# Patient Record
Sex: MALE | Race: WHITE | URBAN - METROPOLITAN AREA
[De-identification: names, ages, dates, MRNs, and addresses within clinical notes are randomized per-mention and may not be internally consistent; named-entity substitution may affect disease eponyms.]

---

## 2017-06-08 ENCOUNTER — EMERGENCY (EMERGENCY)
Facility: HOSPITAL | Age: 62
LOS: 0 days | Discharge: HOME | End: 2017-06-08

## 2017-06-28 DIAGNOSIS — R42 DIZZINESS AND GIDDINESS: ICD-10-CM

## 2017-06-28 DIAGNOSIS — Z79.899 OTHER LONG TERM (CURRENT) DRUG THERAPY: ICD-10-CM

## 2017-06-28 DIAGNOSIS — I10 ESSENTIAL (PRIMARY) HYPERTENSION: ICD-10-CM

## 2017-07-03 ENCOUNTER — EMERGENCY (EMERGENCY)
Facility: HOSPITAL | Age: 62
LOS: 0 days | Discharge: HOME | End: 2017-07-04

## 2017-07-03 ENCOUNTER — EMERGENCY (EMERGENCY)
Facility: HOSPITAL | Age: 62
LOS: 0 days | Discharge: HOME | End: 2017-07-03

## 2017-07-03 DIAGNOSIS — R10.31 RIGHT LOWER QUADRANT PAIN: ICD-10-CM

## 2017-07-03 DIAGNOSIS — Z79.899 OTHER LONG TERM (CURRENT) DRUG THERAPY: ICD-10-CM

## 2017-07-03 DIAGNOSIS — I10 ESSENTIAL (PRIMARY) HYPERTENSION: ICD-10-CM

## 2017-07-03 DIAGNOSIS — N48.89 OTHER SPECIFIED DISORDERS OF PENIS: ICD-10-CM

## 2017-07-03 DIAGNOSIS — R20.0 ANESTHESIA OF SKIN: ICD-10-CM

## 2017-07-06 ENCOUNTER — EMERGENCY (EMERGENCY)
Facility: HOSPITAL | Age: 62
LOS: 0 days | Discharge: HOME | End: 2017-07-06

## 2017-07-06 DIAGNOSIS — I10 ESSENTIAL (PRIMARY) HYPERTENSION: ICD-10-CM

## 2017-07-06 DIAGNOSIS — R42 DIZZINESS AND GIDDINESS: ICD-10-CM

## 2017-07-06 DIAGNOSIS — Z79.899 OTHER LONG TERM (CURRENT) DRUG THERAPY: ICD-10-CM

## 2017-07-06 DIAGNOSIS — R20.0 ANESTHESIA OF SKIN: ICD-10-CM

## 2017-07-18 ENCOUNTER — EMERGENCY (EMERGENCY)
Facility: HOSPITAL | Age: 62
LOS: 0 days | Discharge: HOME | End: 2017-07-18

## 2017-07-18 DIAGNOSIS — F41.9 ANXIETY DISORDER, UNSPECIFIED: ICD-10-CM

## 2017-07-18 DIAGNOSIS — F41.0 PANIC DISORDER [EPISODIC PAROXYSMAL ANXIETY]: ICD-10-CM

## 2017-07-18 DIAGNOSIS — F45.0 SOMATIZATION DISORDER: ICD-10-CM

## 2017-07-18 DIAGNOSIS — T43.595A ADVERSE EFFECT OF OTHER ANTIPSYCHOTICS AND NEUROLEPTICS, INITIAL ENCOUNTER: ICD-10-CM

## 2017-07-18 DIAGNOSIS — G47.00 INSOMNIA, UNSPECIFIED: ICD-10-CM

## 2017-07-18 DIAGNOSIS — R00.2 PALPITATIONS: ICD-10-CM

## 2017-07-18 DIAGNOSIS — I10 ESSENTIAL (PRIMARY) HYPERTENSION: ICD-10-CM

## 2017-07-18 DIAGNOSIS — Z79.899 OTHER LONG TERM (CURRENT) DRUG THERAPY: ICD-10-CM

## 2017-07-18 DIAGNOSIS — R42 DIZZINESS AND GIDDINESS: ICD-10-CM

## 2018-04-16 ENCOUNTER — EMERGENCY (EMERGENCY)
Facility: HOSPITAL | Age: 63
LOS: 0 days | Discharge: HOME | End: 2018-04-16
Attending: EMERGENCY MEDICINE | Admitting: INTERNAL MEDICINE

## 2018-04-16 VITALS
TEMPERATURE: 99 F | DIASTOLIC BLOOD PRESSURE: 111 MMHG | SYSTOLIC BLOOD PRESSURE: 181 MMHG | RESPIRATION RATE: 18 BRPM | OXYGEN SATURATION: 95 % | HEART RATE: 82 BPM

## 2018-04-16 VITALS
TEMPERATURE: 98 F | HEART RATE: 64 BPM | RESPIRATION RATE: 18 BRPM | DIASTOLIC BLOOD PRESSURE: 82 MMHG | OXYGEN SATURATION: 97 % | SYSTOLIC BLOOD PRESSURE: 140 MMHG

## 2018-04-16 DIAGNOSIS — Z79.899 OTHER LONG TERM (CURRENT) DRUG THERAPY: ICD-10-CM

## 2018-04-16 DIAGNOSIS — I10 ESSENTIAL (PRIMARY) HYPERTENSION: ICD-10-CM

## 2018-04-16 DIAGNOSIS — Z90.49 ACQUIRED ABSENCE OF OTHER SPECIFIED PARTS OF DIGESTIVE TRACT: ICD-10-CM

## 2018-04-16 DIAGNOSIS — Z98.890 OTHER SPECIFIED POSTPROCEDURAL STATES: Chronic | ICD-10-CM

## 2018-04-16 DIAGNOSIS — R42 DIZZINESS AND GIDDINESS: ICD-10-CM

## 2018-04-16 DIAGNOSIS — R60.0 LOCALIZED EDEMA: ICD-10-CM

## 2018-04-16 LAB
ALBUMIN SERPL ELPH-MCNC: 4.1 G/DL — SIGNIFICANT CHANGE UP (ref 3.5–5.2)
ALP SERPL-CCNC: 49 U/L — SIGNIFICANT CHANGE UP (ref 30–115)
ALT FLD-CCNC: 28 U/L — SIGNIFICANT CHANGE UP (ref 0–41)
ANION GAP SERPL CALC-SCNC: 13 MMOL/L — SIGNIFICANT CHANGE UP (ref 7–14)
AST SERPL-CCNC: 26 U/L — SIGNIFICANT CHANGE UP (ref 0–41)
BASOPHILS # BLD AUTO: 0.1 K/UL — SIGNIFICANT CHANGE UP (ref 0–0.2)
BASOPHILS NFR BLD AUTO: 1 % — SIGNIFICANT CHANGE UP (ref 0–1)
BILIRUB SERPL-MCNC: 0.4 MG/DL — SIGNIFICANT CHANGE UP (ref 0.2–1.2)
BUN SERPL-MCNC: 13 MG/DL — SIGNIFICANT CHANGE UP (ref 10–20)
CALCIUM SERPL-MCNC: 9 MG/DL — SIGNIFICANT CHANGE UP (ref 8.5–10.1)
CHLORIDE SERPL-SCNC: 103 MMOL/L — SIGNIFICANT CHANGE UP (ref 98–110)
CO2 SERPL-SCNC: 26 MMOL/L — SIGNIFICANT CHANGE UP (ref 17–32)
CREAT SERPL-MCNC: 1 MG/DL — SIGNIFICANT CHANGE UP (ref 0.7–1.5)
EOSINOPHIL # BLD AUTO: 0.03 K/UL — SIGNIFICANT CHANGE UP (ref 0–0.7)
EOSINOPHIL NFR BLD AUTO: 0.3 % — SIGNIFICANT CHANGE UP (ref 0–8)
GLUCOSE SERPL-MCNC: 117 MG/DL — HIGH (ref 70–99)
HCT VFR BLD CALC: 45.5 % — SIGNIFICANT CHANGE UP (ref 42–52)
HGB BLD-MCNC: 16.2 G/DL — SIGNIFICANT CHANGE UP (ref 14–18)
IMM GRANULOCYTES NFR BLD AUTO: 0.9 % — HIGH (ref 0.1–0.3)
LYMPHOCYTES # BLD AUTO: 1.46 K/UL — SIGNIFICANT CHANGE UP (ref 1.2–3.4)
LYMPHOCYTES # BLD AUTO: 14.9 % — LOW (ref 20.5–51.1)
MCHC RBC-ENTMCNC: 30.5 PG — SIGNIFICANT CHANGE UP (ref 27–31)
MCHC RBC-ENTMCNC: 35.6 G/DL — SIGNIFICANT CHANGE UP (ref 32–37)
MCV RBC AUTO: 85.7 FL — SIGNIFICANT CHANGE UP (ref 80–94)
MONOCYTES # BLD AUTO: 0.64 K/UL — HIGH (ref 0.1–0.6)
MONOCYTES NFR BLD AUTO: 6.5 % — SIGNIFICANT CHANGE UP (ref 1.7–9.3)
NEUTROPHILS # BLD AUTO: 7.46 K/UL — HIGH (ref 1.4–6.5)
NEUTROPHILS NFR BLD AUTO: 76.4 % — HIGH (ref 42.2–75.2)
NRBC # BLD: 0 /100 WBCS — SIGNIFICANT CHANGE UP (ref 0–0)
PLATELET # BLD AUTO: 189 K/UL — SIGNIFICANT CHANGE UP (ref 130–400)
POTASSIUM SERPL-MCNC: 4.1 MMOL/L — SIGNIFICANT CHANGE UP (ref 3.5–5)
POTASSIUM SERPL-SCNC: 4.1 MMOL/L — SIGNIFICANT CHANGE UP (ref 3.5–5)
PROT SERPL-MCNC: 6.7 G/DL — SIGNIFICANT CHANGE UP (ref 6–8)
RBC # BLD: 5.31 M/UL — SIGNIFICANT CHANGE UP (ref 4.7–6.1)
RBC # FLD: 12 % — SIGNIFICANT CHANGE UP (ref 11.5–14.5)
SODIUM SERPL-SCNC: 142 MMOL/L — SIGNIFICANT CHANGE UP (ref 135–146)
TROPONIN T SERPL-MCNC: <0.01 NG/ML — SIGNIFICANT CHANGE UP
WBC # BLD: 9.78 K/UL — SIGNIFICANT CHANGE UP (ref 4.8–10.8)
WBC # FLD AUTO: 9.78 K/UL — SIGNIFICANT CHANGE UP (ref 4.8–10.8)

## 2018-04-16 RX ORDER — AMLODIPINE BESYLATE 2.5 MG/1
1 TABLET ORAL
Qty: 10 | Refills: 0 | OUTPATIENT
Start: 2018-04-16 | End: 2018-04-25

## 2018-04-16 NOTE — ED PROVIDER NOTE - OBJECTIVE STATEMENT
62 y m no pmh pw hypertension. Felt "faint" this morning. Describes as lightheadedness intermittently since he woke up today. Checked blood pressure at work and noted to be 200 systolic. EMS called to work. Lightheadedness resolved at this time, currently feeling mild frontal headache. NO cp, sob, abd pain, n/v, fever, chills, dysuria, hematuria, change in urine output, blurry vision, weakness of extremities. No known hx of hypertension, no regular medications.

## 2018-04-16 NOTE — ED PROVIDER NOTE - NS ED ROS FT
Eyes:  No visual changes, eye pain or discharge.  ENMT:  No hearing changes, pain, discharge or infections. No neck pain or stiffness.  Cardiac:  No chest pain, SOB or edema.   Respiratory:  No cough or respiratory distress.   GI:  No nausea, vomiting, diarrhea or abdominal pain.  :  No dysuria, frequency or burning.  MS:  No myalgia, muscle weakness, joint pain or back pain.  Neuro:  Headache. No weakness.  No LOC.  Skin:  No skin rash.

## 2018-04-16 NOTE — ED ADULT NURSE NOTE - OBJECTIVE STATEMENT
Pt presents to ED with HTN, taken by school nurse 240/120. In triage 180/111. Noother complaints or symptoms.

## 2018-04-16 NOTE — ED PROVIDER NOTE - PHYSICAL EXAMINATION
CONSTITUTIONAL: Well-developed; well-nourished; in no acute distress.   SKIN: warm, dry  HEAD: Normocephalic; atraumatic.  EYES: PERRL, EOMI, normal sclera and conjunctiva   ENT: No nasal discharge; airway clear.  NECK: Supple; non tender.  CARD: S1, S2 normal; no murmurs, gallops, or rubs. Regular rate and rhythm.   RESP: No wheezes, rales or rhonchi.  ABD: soft ntnd, bowel sounds normal.  EXT: Normal ROM.  No clubbing, cyanosis or edema. No posterior calf ttp.  LYMPH: No acute cervical adenopathy.  NEURO: Alert, oriented, grossly unremarkable. No cranial nerve deficits, strength and sensation equal over all 4 extremities, moving all extremities.  PSYCH: Cooperative, appropriate.

## 2018-04-16 NOTE — ED ADULT NURSE NOTE - CHIEF COMPLAINT QUOTE
Pt reports feeling tingling all over so had BP taken at work and was found to be high. Denies history of hypertension.

## 2018-04-16 NOTE — ED PROVIDER NOTE - PROGRESS NOTE DETAILS
ATTENDING NOTE:   61 y/o M with no significant PMH presents to ED for evaluation of hypertension. Pt reports that over the last few weeks he had URI SX, but resolved few days ago. Today at work felt a “sensation” in the left side of his chest that lasted few seconds and resolved. (+)Associated dizziness during this episode, lasted a few minutes and resolved. 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 No SOB, abdominal pain, nausea, vomiting. Notes some swelling to b/l LE, no pain. Before today he last took his BP few weeks ago ’s systolic.    On exam: Pt is non-toxic, WA. PERRL, EOMI, pink conjunctiva, anicteric. MMM, tongue is midline. Neck is supple, no crepitus over neck or chest wall. Radial pulses are equal b/l. Regular rate, no murmurs. Lungs CTAB. Abdomen is soft, NT/ND. No CVAT. (+)Trace pitting edema b/l, no calf tenderness. No focal neuro deficits. Normal gait.   Will get labs, imaging and reassess. Pt asymptomatic at this time. Discussed lab, xray, ekg results. Will send pt amlodipine prescription to pharmacy until he can reach pmd office. Told him to follow up with Dr. Young, his pmd, later this week for long term bp management.

## 2018-05-03 ENCOUNTER — OUTPATIENT (OUTPATIENT)
Dept: OUTPATIENT SERVICES | Facility: HOSPITAL | Age: 63
LOS: 1 days | Discharge: HOME | End: 2018-05-03

## 2018-05-03 DIAGNOSIS — R05 COUGH: ICD-10-CM

## 2018-05-03 DIAGNOSIS — Z98.890 OTHER SPECIFIED POSTPROCEDURAL STATES: Chronic | ICD-10-CM

## 2018-05-10 ENCOUNTER — EMERGENCY (EMERGENCY)
Facility: HOSPITAL | Age: 63
LOS: 0 days | Discharge: HOME | End: 2018-05-11
Attending: EMERGENCY MEDICINE | Admitting: EMERGENCY MEDICINE

## 2018-05-10 VITALS
TEMPERATURE: 97 F | SYSTOLIC BLOOD PRESSURE: 182 MMHG | HEART RATE: 85 BPM | DIASTOLIC BLOOD PRESSURE: 117 MMHG | OXYGEN SATURATION: 97 %

## 2018-05-10 DIAGNOSIS — R00.2 PALPITATIONS: ICD-10-CM

## 2018-05-10 DIAGNOSIS — R07.9 CHEST PAIN, UNSPECIFIED: ICD-10-CM

## 2018-05-10 DIAGNOSIS — Z98.890 OTHER SPECIFIED POSTPROCEDURAL STATES: Chronic | ICD-10-CM

## 2018-05-10 DIAGNOSIS — I10 ESSENTIAL (PRIMARY) HYPERTENSION: ICD-10-CM

## 2018-05-10 DIAGNOSIS — Z87.891 PERSONAL HISTORY OF NICOTINE DEPENDENCE: ICD-10-CM

## 2018-05-10 LAB
BASOPHILS # BLD AUTO: 0.09 K/UL — SIGNIFICANT CHANGE UP (ref 0–0.2)
BASOPHILS NFR BLD AUTO: 0.9 % — SIGNIFICANT CHANGE UP (ref 0–1)
EOSINOPHIL # BLD AUTO: 0.09 K/UL — SIGNIFICANT CHANGE UP (ref 0–0.7)
EOSINOPHIL NFR BLD AUTO: 0.9 % — SIGNIFICANT CHANGE UP (ref 0–8)
HCT VFR BLD CALC: 44.3 % — SIGNIFICANT CHANGE UP (ref 42–52)
HGB BLD-MCNC: 15.6 G/DL — SIGNIFICANT CHANGE UP (ref 14–18)
IMM GRANULOCYTES NFR BLD AUTO: 0.6 % — HIGH (ref 0.1–0.3)
LYMPHOCYTES # BLD AUTO: 2.61 K/UL — SIGNIFICANT CHANGE UP (ref 1.2–3.4)
LYMPHOCYTES # BLD AUTO: 26.1 % — SIGNIFICANT CHANGE UP (ref 20.5–51.1)
MCHC RBC-ENTMCNC: 30.6 PG — SIGNIFICANT CHANGE UP (ref 27–31)
MCHC RBC-ENTMCNC: 35.2 G/DL — SIGNIFICANT CHANGE UP (ref 32–37)
MCV RBC AUTO: 87 FL — SIGNIFICANT CHANGE UP (ref 80–94)
MONOCYTES # BLD AUTO: 0.66 K/UL — HIGH (ref 0.1–0.6)
MONOCYTES NFR BLD AUTO: 6.6 % — SIGNIFICANT CHANGE UP (ref 1.7–9.3)
NEUTROPHILS # BLD AUTO: 6.5 K/UL — SIGNIFICANT CHANGE UP (ref 1.4–6.5)
NEUTROPHILS NFR BLD AUTO: 64.9 % — SIGNIFICANT CHANGE UP (ref 42.2–75.2)
NRBC # BLD: 0 /100 WBCS — SIGNIFICANT CHANGE UP (ref 0–0)
PLATELET # BLD AUTO: 166 K/UL — SIGNIFICANT CHANGE UP (ref 130–400)
RBC # BLD: 5.09 M/UL — SIGNIFICANT CHANGE UP (ref 4.7–6.1)
RBC # FLD: 12.6 % — SIGNIFICANT CHANGE UP (ref 11.5–14.5)
WBC # BLD: 10.01 K/UL — SIGNIFICANT CHANGE UP (ref 4.8–10.8)
WBC # FLD AUTO: 10.01 K/UL — SIGNIFICANT CHANGE UP (ref 4.8–10.8)

## 2018-05-10 NOTE — ED ADULT NURSE NOTE - OBJECTIVE STATEMENT
pt states he accidentally took a metoprolol pill last week. since then he has been having intermittent sharp midsternal chest pain. reports dizziness, headache, nausea, palpitations, and generally "not feeling well".

## 2018-05-10 NOTE — ED ADULT TRIAGE NOTE - CHIEF COMPLAINT QUOTE
Pt came c/o left sided chest pain on and off for 1 week after taking his wife Metoprolol 25 mg by an accident.

## 2018-05-11 VITALS
RESPIRATION RATE: 18 BRPM | HEART RATE: 61 BPM | DIASTOLIC BLOOD PRESSURE: 89 MMHG | SYSTOLIC BLOOD PRESSURE: 153 MMHG | OXYGEN SATURATION: 96 % | TEMPERATURE: 97 F

## 2018-05-11 LAB
ALBUMIN SERPL ELPH-MCNC: 4 G/DL — SIGNIFICANT CHANGE UP (ref 3.5–5.2)
ALP SERPL-CCNC: 43 U/L — SIGNIFICANT CHANGE UP (ref 30–115)
ALT FLD-CCNC: 28 U/L — SIGNIFICANT CHANGE UP (ref 0–41)
ANION GAP SERPL CALC-SCNC: 15 MMOL/L — HIGH (ref 7–14)
AST SERPL-CCNC: 22 U/L — SIGNIFICANT CHANGE UP (ref 0–41)
BILIRUB SERPL-MCNC: 0.6 MG/DL — SIGNIFICANT CHANGE UP (ref 0.2–1.2)
BUN SERPL-MCNC: 15 MG/DL — SIGNIFICANT CHANGE UP (ref 10–20)
CALCIUM SERPL-MCNC: 8.7 MG/DL — SIGNIFICANT CHANGE UP (ref 8.5–10.1)
CHLORIDE SERPL-SCNC: 103 MMOL/L — SIGNIFICANT CHANGE UP (ref 98–110)
CHOLEST SERPL-MCNC: 208 MG/DL — HIGH (ref 100–200)
CK MB CFR SERPL CALC: 6 NG/ML — SIGNIFICANT CHANGE UP (ref 0.6–6.3)
CK MB CFR SERPL CALC: 6.4 NG/ML — HIGH (ref 0.6–6.3)
CK SERPL-CCNC: 225 U/L — SIGNIFICANT CHANGE UP (ref 0–225)
CK SERPL-CCNC: 259 U/L — HIGH (ref 0–225)
CO2 SERPL-SCNC: 24 MMOL/L — SIGNIFICANT CHANGE UP (ref 17–32)
CREAT SERPL-MCNC: 0.9 MG/DL — SIGNIFICANT CHANGE UP (ref 0.7–1.5)
GLUCOSE SERPL-MCNC: 103 MG/DL — HIGH (ref 70–99)
HDLC SERPL-MCNC: 40 MG/DL — SIGNIFICANT CHANGE UP (ref 40–125)
HDLC SERPL-MCNC: 44 MG/DL — SIGNIFICANT CHANGE UP (ref 40–125)
LACTATE SERPL-SCNC: 0.8 MMOL/L — SIGNIFICANT CHANGE UP (ref 0.5–2.2)
LIPID PNL WITH DIRECT LDL SERPL: 142 MG/DL — HIGH (ref 4–129)
POTASSIUM SERPL-MCNC: 3.6 MMOL/L — SIGNIFICANT CHANGE UP (ref 3.5–5)
POTASSIUM SERPL-SCNC: 3.6 MMOL/L — SIGNIFICANT CHANGE UP (ref 3.5–5)
PROT SERPL-MCNC: 6.4 G/DL — SIGNIFICANT CHANGE UP (ref 6–8)
SODIUM SERPL-SCNC: 142 MMOL/L — SIGNIFICANT CHANGE UP (ref 135–146)
TOTAL CHOLESTEROL/HDL RATIO MEASUREMENT: 5.2 RATIO — SIGNIFICANT CHANGE UP (ref 4–5.5)
TRIGL SERPL-MCNC: 189 MG/DL — HIGH (ref 10–149)
TROPONIN T SERPL-MCNC: <0.01 NG/ML — SIGNIFICANT CHANGE UP
TROPONIN T SERPL-MCNC: <0.01 NG/ML — SIGNIFICANT CHANGE UP

## 2018-05-11 RX ORDER — ASPIRIN/CALCIUM CARB/MAGNESIUM 324 MG
325 TABLET ORAL ONCE
Qty: 0 | Refills: 0 | Status: COMPLETED | OUTPATIENT
Start: 2018-05-11 | End: 2018-05-11

## 2018-05-11 RX ORDER — DIPHENHYDRAMINE HCL 50 MG
50 CAPSULE ORAL ONCE
Qty: 0 | Refills: 0 | Status: COMPLETED | OUTPATIENT
Start: 2018-05-11 | End: 2018-05-11

## 2018-05-11 RX ORDER — ATORVASTATIN CALCIUM 80 MG/1
1 TABLET, FILM COATED ORAL
Qty: 30 | Refills: 0 | OUTPATIENT
Start: 2018-05-11 | End: 2018-06-09

## 2018-05-11 RX ORDER — METOPROLOL TARTRATE 50 MG
50 TABLET ORAL ONCE
Qty: 0 | Refills: 0 | Status: COMPLETED | OUTPATIENT
Start: 2018-05-11 | End: 2018-05-11

## 2018-05-11 RX ORDER — ASPIRIN/CALCIUM CARB/MAGNESIUM 324 MG
1 TABLET ORAL
Qty: 30 | Refills: 0 | OUTPATIENT
Start: 2018-05-11 | End: 2018-06-09

## 2018-05-11 RX ADMIN — Medication 50 MILLIGRAM(S): at 05:13

## 2018-05-11 RX ADMIN — Medication 325 MILLIGRAM(S): at 04:39

## 2018-05-11 NOTE — ED CDU PROVIDER INITIAL DAY NOTE - PROGRESS NOTE DETAILS
Patient resting comfortably in no distress. Will continue to monitor. Cardiac enzymes negative x 2 with no EKG changes.

## 2018-05-11 NOTE — ED PROVIDER NOTE - PHYSICAL EXAMINATION
Alert, NAD, WDWN, well-appearing  PERRL, EOMI, normal pupils, no icterus, normal external ENT, pink/moist membranes  Airway intact, Lungs CTAB, no wheezing or rhonchi, normal resp effort w/o tachypnea, no retractions, speaking full sentences  CVS1S2, RRR, no m/g/r, 2+ pulses b/l, warm/well-perfused  Abdomen soft, nondistended, no tenderness on palpation to all 4 quadrants, no rebound or rigidity, no CVA tenderness  FROM all 4 ext, no bony tenderness or obvious deformities  Skin warm/dry, no acute rash

## 2018-05-11 NOTE — ED CDU PROVIDER INITIAL DAY NOTE - NS ED ROS FT
Review of Systems:  	•	CONSTITUTIONAL - no fever, no diaphoresis, no chills  	•	SKIN - no rash  	•	HEMATOLOGIC - no bleeding, no bruising  	•	EYES - no eye pain, no blurry vision  	•	ENT - no congestion  	•	RESPIRATORY - no shortness of breath, + cough  	•	CARDIAC - + chest pain, + palpitations  	•	GI - no abd pain, no nausea, no vomiting, no diarrhea  	•	GENITO-URINARY - no dysuria; no hematuria, no increased urinary frequency  	•	MUSCULOSKELETAL - no joint paint, no swelling, no redness  	•	NEUROLOGIC - no weakness, no headache, no paresthesias, no LOC

## 2018-05-11 NOTE — ED PROVIDER NOTE - ATTENDING CONTRIBUTION TO CARE
I personally evaluated the patient. I reviewed the Resident’s or Physician Assistant’s note (as assigned above), and agree with the findings and plan except as documented in my note.  62 yr M with complaints of mid CP and palpitations with associated SOB, and lightheadedness. Onset of symptoms 1 week ago after pt accidentally took Metoprolol 25mg instead of his usual Losartan. No coughing, fever, abd or urinary complaints. VS reviewed, pt well appearing, NAD and comfortable appearing. Head ncat, neck supple w/o JVD, normal s1s2 without any murmurs, Lungs CTAB with normal work of breathing. abd +BS, s/nd/nt, extremities wnl, neuro exam grossly normal. No acute skin rashes. Plan is EKG, ASA, labs with CE, CXR and reassess.

## 2018-05-11 NOTE — ED CDU PROVIDER INITIAL DAY NOTE - OBJECTIVE STATEMENT
61 yo M with pmhx of HTN, presenting with non-radiating, substernal intermittent chest pain and palpitations x 1 week. Describes his chest pain as a "jolt" that lasts seconds. Pain is moderate with no alleviating or aggravating factors. States he had a cough last week which he was given a Z-pack for. No sob, fever, chills, abdominal pain, nausea, vomiting, diarrhea, back pain, urinary symptoms, headache, dizziness,  paresthesias, or weakness. Pt states that he has never seen a cardiologist or had any stress testing. Reports taking metoprolol once last week accidently.

## 2018-05-11 NOTE — ED PROVIDER NOTE - OBJECTIVE STATEMENT
62 yr old male, PMH of HTN, presenting with chest pain and palpitations for the past week, does not radiate and has been intermittent, denies any rash, SOB, abd pain, N/V/D, or dysuria. Has never seen a cardiologist or had any stress testing. States he accidentally took metoprolol once one week ago by mistake. Does not smoke currently

## 2018-05-11 NOTE — ED ADULT NURSE REASSESSMENT NOTE - NS ED NURSE REASSESS COMMENT FT1
pt currently resting comfortably, on cardiac monitor. no s/s pain or discomfort. VSS. trops re-drawn and sent to lab

## 2018-05-11 NOTE — ED PROVIDER NOTE - NS ED ROS FT
Review of Systems:  	•	CONSTITUTIONAL - no fever, no diaphoresis, no chills  	•	SKIN - no rash  	•	RESPIRATORY - no shortness of breath, no cough  	•	CARDIAC - +chest pain  	•	GI - no abd pain, no nausea, no vomiting, no diarrhea  	•	GENITO-URINARY - no discharge, no dysuria; no hematuria, no increased urinary frequency  	•	MUSCULOSKELETAL - no joint paint, no swelling, no redness

## 2018-05-29 ENCOUNTER — INPATIENT (INPATIENT)
Facility: HOSPITAL | Age: 63
LOS: 1 days | Discharge: HOME | End: 2018-05-31
Attending: INTERNAL MEDICINE | Admitting: INTERNAL MEDICINE

## 2018-05-29 VITALS
RESPIRATION RATE: 18 BRPM | OXYGEN SATURATION: 98 % | HEART RATE: 90 BPM | SYSTOLIC BLOOD PRESSURE: 134 MMHG | TEMPERATURE: 98 F | DIASTOLIC BLOOD PRESSURE: 84 MMHG

## 2018-05-29 DIAGNOSIS — Z98.890 OTHER SPECIFIED POSTPROCEDURAL STATES: Chronic | ICD-10-CM

## 2018-05-29 LAB
ALBUMIN SERPL ELPH-MCNC: 4 G/DL — SIGNIFICANT CHANGE UP (ref 3.5–5.2)
ALP SERPL-CCNC: 46 U/L — SIGNIFICANT CHANGE UP (ref 30–115)
ALT FLD-CCNC: 30 U/L — SIGNIFICANT CHANGE UP (ref 0–41)
ANION GAP SERPL CALC-SCNC: 14 MMOL/L — SIGNIFICANT CHANGE UP (ref 7–14)
APPEARANCE UR: CLEAR — SIGNIFICANT CHANGE UP
AST SERPL-CCNC: 23 U/L — SIGNIFICANT CHANGE UP (ref 0–41)
BASE EXCESS BLDV CALC-SCNC: 4.3 MMOL/L — HIGH (ref -2–2)
BASOPHILS # BLD AUTO: 0.06 K/UL — SIGNIFICANT CHANGE UP (ref 0–0.2)
BASOPHILS NFR BLD AUTO: 0.6 % — SIGNIFICANT CHANGE UP (ref 0–1)
BILIRUB SERPL-MCNC: 0.5 MG/DL — SIGNIFICANT CHANGE UP (ref 0.2–1.2)
BILIRUB UR-MCNC: NEGATIVE — SIGNIFICANT CHANGE UP
BUN SERPL-MCNC: 12 MG/DL — SIGNIFICANT CHANGE UP (ref 10–20)
CA-I SERPL-SCNC: 1.2 MMOL/L — SIGNIFICANT CHANGE UP (ref 1.12–1.3)
CALCIUM SERPL-MCNC: 9.1 MG/DL — SIGNIFICANT CHANGE UP (ref 8.5–10.1)
CHLORIDE SERPL-SCNC: 101 MMOL/L — SIGNIFICANT CHANGE UP (ref 98–110)
CHOLEST SERPL-MCNC: 202 MG/DL — HIGH (ref 100–200)
CK MB CFR SERPL CALC: 4.8 NG/ML — SIGNIFICANT CHANGE UP (ref 0.6–6.3)
CK SERPL-CCNC: 207 U/L — SIGNIFICANT CHANGE UP (ref 0–225)
CO2 SERPL-SCNC: 26 MMOL/L — SIGNIFICANT CHANGE UP (ref 17–32)
COLOR SPEC: YELLOW — SIGNIFICANT CHANGE UP
CREAT SERPL-MCNC: 1 MG/DL — SIGNIFICANT CHANGE UP (ref 0.7–1.5)
DIFF PNL FLD: NEGATIVE — SIGNIFICANT CHANGE UP
EOSINOPHIL # BLD AUTO: 0.08 K/UL — SIGNIFICANT CHANGE UP (ref 0–0.7)
EOSINOPHIL NFR BLD AUTO: 0.8 % — SIGNIFICANT CHANGE UP (ref 0–8)
GAS PNL BLDV: 142 MMOL/L — SIGNIFICANT CHANGE UP (ref 136–145)
GAS PNL BLDV: SIGNIFICANT CHANGE UP
GLUCOSE SERPL-MCNC: 103 MG/DL — HIGH (ref 70–99)
GLUCOSE UR QL: NEGATIVE MG/DL — SIGNIFICANT CHANGE UP
HCO3 BLDV-SCNC: 29 MMOL/L — SIGNIFICANT CHANGE UP (ref 22–29)
HCT VFR BLD CALC: 46.4 % — SIGNIFICANT CHANGE UP (ref 42–52)
HCT VFR BLDA CALC: 52.3 % — HIGH (ref 34–44)
HDLC SERPL-MCNC: 42 MG/DL — SIGNIFICANT CHANGE UP (ref 40–125)
HGB BLD CALC-MCNC: 17.1 G/DL — SIGNIFICANT CHANGE UP (ref 14–18)
HGB BLD-MCNC: 16.3 G/DL — SIGNIFICANT CHANGE UP (ref 14–18)
IMM GRANULOCYTES NFR BLD AUTO: 0.5 % — HIGH (ref 0.1–0.3)
KETONES UR-MCNC: NEGATIVE — SIGNIFICANT CHANGE UP
LACTATE BLDV-MCNC: 0.6 MMOL/L — SIGNIFICANT CHANGE UP (ref 0.5–1.6)
LEUKOCYTE ESTERASE UR-ACNC: NEGATIVE — SIGNIFICANT CHANGE UP
LIPID PNL WITH DIRECT LDL SERPL: 126 MG/DL — SIGNIFICANT CHANGE UP (ref 4–129)
LYMPHOCYTES # BLD AUTO: 1.95 K/UL — SIGNIFICANT CHANGE UP (ref 1.2–3.4)
LYMPHOCYTES # BLD AUTO: 19.9 % — LOW (ref 20.5–51.1)
MCHC RBC-ENTMCNC: 30.9 PG — SIGNIFICANT CHANGE UP (ref 27–31)
MCHC RBC-ENTMCNC: 35.1 G/DL — SIGNIFICANT CHANGE UP (ref 32–37)
MCV RBC AUTO: 88 FL — SIGNIFICANT CHANGE UP (ref 80–94)
MONOCYTES # BLD AUTO: 0.62 K/UL — HIGH (ref 0.1–0.6)
MONOCYTES NFR BLD AUTO: 6.3 % — SIGNIFICANT CHANGE UP (ref 1.7–9.3)
NEUTROPHILS # BLD AUTO: 7.02 K/UL — HIGH (ref 1.4–6.5)
NEUTROPHILS NFR BLD AUTO: 71.9 % — SIGNIFICANT CHANGE UP (ref 42.2–75.2)
NITRITE UR-MCNC: NEGATIVE — SIGNIFICANT CHANGE UP
NRBC # BLD: 0 /100 WBCS — SIGNIFICANT CHANGE UP (ref 0–0)
PCO2 BLDV: 41 MMHG — SIGNIFICANT CHANGE UP (ref 41–51)
PH BLDV: 7.46 — HIGH (ref 7.26–7.43)
PH UR: 7.5 — SIGNIFICANT CHANGE UP (ref 5–8)
PLATELET # BLD AUTO: 181 K/UL — SIGNIFICANT CHANGE UP (ref 130–400)
PO2 BLDV: 63 MMHG — HIGH (ref 20–40)
POTASSIUM BLDV-SCNC: 3.7 MMOL/L — SIGNIFICANT CHANGE UP (ref 3.3–5.6)
POTASSIUM SERPL-MCNC: 4 MMOL/L — SIGNIFICANT CHANGE UP (ref 3.5–5)
POTASSIUM SERPL-SCNC: 4 MMOL/L — SIGNIFICANT CHANGE UP (ref 3.5–5)
PROT SERPL-MCNC: 6.7 G/DL — SIGNIFICANT CHANGE UP (ref 6–8)
PROT UR-MCNC: NEGATIVE MG/DL — SIGNIFICANT CHANGE UP
RBC # BLD: 5.27 M/UL — SIGNIFICANT CHANGE UP (ref 4.7–6.1)
RBC # FLD: 12.5 % — SIGNIFICANT CHANGE UP (ref 11.5–14.5)
SAO2 % BLDV: 95 % — SIGNIFICANT CHANGE UP
SODIUM SERPL-SCNC: 141 MMOL/L — SIGNIFICANT CHANGE UP (ref 135–146)
SP GR SPEC: 1.01 — SIGNIFICANT CHANGE UP (ref 1.01–1.03)
TOTAL CHOLESTEROL/HDL RATIO MEASUREMENT: 4.8 RATIO — SIGNIFICANT CHANGE UP (ref 4–5.5)
TRIGL SERPL-MCNC: 323 MG/DL — HIGH (ref 10–149)
TROPONIN T SERPL-MCNC: <0.01 NG/ML — SIGNIFICANT CHANGE UP
UROBILINOGEN FLD QL: 0.2 MG/DL — SIGNIFICANT CHANGE UP (ref 0.2–0.2)
WBC # BLD: 9.78 K/UL — SIGNIFICANT CHANGE UP (ref 4.8–10.8)
WBC # FLD AUTO: 9.78 K/UL — SIGNIFICANT CHANGE UP (ref 4.8–10.8)

## 2018-05-29 RX ORDER — SODIUM CHLORIDE 9 MG/ML
3 INJECTION INTRAMUSCULAR; INTRAVENOUS; SUBCUTANEOUS EVERY 8 HOURS
Qty: 0 | Refills: 0 | Status: DISCONTINUED | OUTPATIENT
Start: 2018-05-29 | End: 2018-05-31

## 2018-05-29 RX ORDER — FAMOTIDINE 10 MG/ML
20 INJECTION INTRAVENOUS ONCE
Qty: 0 | Refills: 0 | Status: COMPLETED | OUTPATIENT
Start: 2018-05-29 | End: 2018-05-29

## 2018-05-29 RX ORDER — SODIUM CHLORIDE 9 MG/ML
500 INJECTION INTRAMUSCULAR; INTRAVENOUS; SUBCUTANEOUS ONCE
Qty: 0 | Refills: 0 | Status: COMPLETED | OUTPATIENT
Start: 2018-05-29 | End: 2018-05-29

## 2018-05-29 RX ORDER — ASPIRIN/CALCIUM CARB/MAGNESIUM 324 MG
325 TABLET ORAL ONCE
Qty: 0 | Refills: 0 | Status: COMPLETED | OUTPATIENT
Start: 2018-05-29 | End: 2018-05-29

## 2018-05-29 RX ADMIN — SODIUM CHLORIDE 3 MILLILITER(S): 9 INJECTION INTRAMUSCULAR; INTRAVENOUS; SUBCUTANEOUS at 21:30

## 2018-05-29 RX ADMIN — FAMOTIDINE 20 MILLIGRAM(S): 10 INJECTION INTRAVENOUS at 21:29

## 2018-05-29 RX ADMIN — Medication 325 MILLIGRAM(S): at 21:29

## 2018-05-29 RX ADMIN — SODIUM CHLORIDE 250 MILLILITER(S): 9 INJECTION INTRAMUSCULAR; INTRAVENOUS; SUBCUTANEOUS at 21:29

## 2018-05-29 NOTE — ED PROVIDER NOTE - NS ED ROS FT
REVIEW OF SYSTEMS:    CONSTITUTIONAL: sweats, no fevers, chills or weakness   EYES/ENT: No visual changes;  No vertigo or throat pain   NECK: No pain or stiffness  RESPIRATORY: shortness of breath, no cough, no wheeze, no hemoptysis   CARDIOVASCULAR: +palpitations, +  GASTROINTESTINAL: +diarrhea, denies constipation, denies abdominal pain   GENITOURINARY: urinary frequency, no dysuria, no hematuria   NEUROLOGICAL: episodic numbness and tingling in hands   SKIN: No itching, rashes

## 2018-05-29 NOTE — ED PROVIDER NOTE - PLAN OF CARE
>3 weeks of palpitations and shortness of breath. Pt requires inpatient telemetry monitoring with formal evaluation by cardiology to determine if patient needs catheterization vs holter monitoring

## 2018-05-29 NOTE — ED PROVIDER NOTE - OBJECTIVE STATEMENT
61 y/o male with past  history of HTN presents with a few weeks of palpitations and shortness of breath. Patient reports that he feels "electric pulsating shocks" across his chest which comes and goes, worsened by laying flat. Patient also reports sternal pain which radiates up into his neck and sometimes results in numbness and tingling in his fingers . He reports that all of this started after he took one of his wife's 25mg beta blockers by accident. Patient takes aspirin, losartan and statin as home meds, but reportedly does not take any other medications, denies smoking or alcohol use.   Endorses one episode of sweats, one episode of diarrhea, urinary frequency, shortness of breath. Denies all other symptoms.   Patient went to see his PMD today, Dr. Young who sent him here for observation. 61 y/o male with past history of HTN presents with a few weeks of palpitations and shortness of breath. Patient reports that he feels "electric pulsating shocks" across his chest which comes and goes, worsened by laying flat. Patient also reports sternal pain which radiates up into his neck and sometimes results in numbness and tingling in his fingers . He reports that all of this started after he took one of his wife's 25mg beta blockers by accident. Patient takes aspirin, losartan and statin as home meds, but reportedly does not take any other medications, denies smoking or alcohol use.   Endorses one episode of sweats, one episode of diarrhea, urinary frequency, shortness of breath. Denies all other symptoms.   Patient went to see his PMD today, Dr. Young who sent him here for observation.

## 2018-05-29 NOTE — ED PROVIDER NOTE - ATTENDING CONTRIBUTION TO CARE
61 yo male with PMH of HTN presents to the ER for palpitations and SOB. Seen at PMD office and sent here for admission for cardiac monitoring. PT states he took his wife's beta blocker (one tab) a few WEEKS ago, and since then has felt some electrical impulses (twinges) across his chest "60-90 times a day". Worse with laying flat. NO leg swelling. No URI symptoms. No N/V/F/C/jaw pain or arm pain. Pt had a CCTA May 2018 which showed minor irregularities but large blockage. Exam as per resident note. Sent in for likely holter/loop recorder and eval per cardiology for cath vs no cath.

## 2018-05-30 LAB
ANION GAP SERPL CALC-SCNC: 14 MMOL/L — SIGNIFICANT CHANGE UP (ref 7–14)
BASOPHILS # BLD AUTO: 0.08 K/UL — SIGNIFICANT CHANGE UP (ref 0–0.2)
BASOPHILS NFR BLD AUTO: 1 % — SIGNIFICANT CHANGE UP (ref 0–1)
BUN SERPL-MCNC: 12 MG/DL — SIGNIFICANT CHANGE UP (ref 10–20)
CALCIUM SERPL-MCNC: 8.8 MG/DL — SIGNIFICANT CHANGE UP (ref 8.5–10.1)
CHLORIDE SERPL-SCNC: 103 MMOL/L — SIGNIFICANT CHANGE UP (ref 98–110)
CHOLEST SERPL-MCNC: 186 MG/DL — SIGNIFICANT CHANGE UP (ref 100–200)
CK MB CFR SERPL CALC: 3.8 NG/ML — SIGNIFICANT CHANGE UP (ref 0.6–6.3)
CK SERPL-CCNC: 157 U/L — SIGNIFICANT CHANGE UP (ref 0–225)
CO2 SERPL-SCNC: 24 MMOL/L — SIGNIFICANT CHANGE UP (ref 17–32)
CREAT SERPL-MCNC: 0.9 MG/DL — SIGNIFICANT CHANGE UP (ref 0.7–1.5)
EOSINOPHIL # BLD AUTO: 0.15 K/UL — SIGNIFICANT CHANGE UP (ref 0–0.7)
EOSINOPHIL NFR BLD AUTO: 1.9 % — SIGNIFICANT CHANGE UP (ref 0–8)
ESTIMATED AVERAGE GLUCOSE: 114 MG/DL — SIGNIFICANT CHANGE UP (ref 68–114)
GLUCOSE SERPL-MCNC: 98 MG/DL — SIGNIFICANT CHANGE UP (ref 70–99)
HBA1C BLD-MCNC: 5.6 % — SIGNIFICANT CHANGE UP (ref 4–5.6)
HCT VFR BLD CALC: 43.7 % — SIGNIFICANT CHANGE UP (ref 42–52)
HDLC SERPL-MCNC: 39 MG/DL — LOW (ref 40–125)
HGB BLD-MCNC: 15.3 G/DL — SIGNIFICANT CHANGE UP (ref 14–18)
IMM GRANULOCYTES NFR BLD AUTO: 0.4 % — HIGH (ref 0.1–0.3)
LIPID PNL WITH DIRECT LDL SERPL: 116 MG/DL — SIGNIFICANT CHANGE UP (ref 4–129)
LYMPHOCYTES # BLD AUTO: 2.07 K/UL — SIGNIFICANT CHANGE UP (ref 1.2–3.4)
LYMPHOCYTES # BLD AUTO: 26.4 % — SIGNIFICANT CHANGE UP (ref 20.5–51.1)
MAGNESIUM SERPL-MCNC: 2.1 MG/DL — SIGNIFICANT CHANGE UP (ref 1.8–2.4)
MCHC RBC-ENTMCNC: 30.9 PG — SIGNIFICANT CHANGE UP (ref 27–31)
MCHC RBC-ENTMCNC: 35 G/DL — SIGNIFICANT CHANGE UP (ref 32–37)
MCV RBC AUTO: 88.3 FL — SIGNIFICANT CHANGE UP (ref 80–94)
MONOCYTES # BLD AUTO: 0.54 K/UL — SIGNIFICANT CHANGE UP (ref 0.1–0.6)
MONOCYTES NFR BLD AUTO: 6.9 % — SIGNIFICANT CHANGE UP (ref 1.7–9.3)
NEUTROPHILS # BLD AUTO: 4.97 K/UL — SIGNIFICANT CHANGE UP (ref 1.4–6.5)
NEUTROPHILS NFR BLD AUTO: 63.4 % — SIGNIFICANT CHANGE UP (ref 42.2–75.2)
PLATELET # BLD AUTO: 169 K/UL — SIGNIFICANT CHANGE UP (ref 130–400)
POTASSIUM SERPL-MCNC: 3.9 MMOL/L — SIGNIFICANT CHANGE UP (ref 3.5–5)
POTASSIUM SERPL-SCNC: 3.9 MMOL/L — SIGNIFICANT CHANGE UP (ref 3.5–5)
RBC # BLD: 4.95 M/UL — SIGNIFICANT CHANGE UP (ref 4.7–6.1)
RBC # FLD: 12.5 % — SIGNIFICANT CHANGE UP (ref 11.5–14.5)
SODIUM SERPL-SCNC: 141 MMOL/L — SIGNIFICANT CHANGE UP (ref 135–146)
TOTAL CHOLESTEROL/HDL RATIO MEASUREMENT: 4.8 RATIO — SIGNIFICANT CHANGE UP (ref 4–5.5)
TRIGL SERPL-MCNC: 223 MG/DL — HIGH (ref 10–149)
TROPONIN T SERPL-MCNC: <0.01 NG/ML — SIGNIFICANT CHANGE UP
TSH SERPL-MCNC: 3.34 UIU/ML — SIGNIFICANT CHANGE UP (ref 0.27–4.2)
WBC # BLD: 7.84 K/UL — SIGNIFICANT CHANGE UP (ref 4.8–10.8)
WBC # FLD AUTO: 7.84 K/UL — SIGNIFICANT CHANGE UP (ref 4.8–10.8)

## 2018-05-30 RX ORDER — LOSARTAN POTASSIUM 100 MG/1
25 TABLET, FILM COATED ORAL DAILY
Qty: 0 | Refills: 0 | Status: DISCONTINUED | OUTPATIENT
Start: 2018-05-30 | End: 2018-05-31

## 2018-05-30 RX ORDER — ENOXAPARIN SODIUM 100 MG/ML
40 INJECTION SUBCUTANEOUS EVERY 24 HOURS
Qty: 0 | Refills: 0 | Status: DISCONTINUED | OUTPATIENT
Start: 2018-05-30 | End: 2018-05-31

## 2018-05-30 RX ORDER — ISOSORBIDE MONONITRATE 60 MG/1
30 TABLET, EXTENDED RELEASE ORAL DAILY
Qty: 0 | Refills: 0 | Status: DISCONTINUED | OUTPATIENT
Start: 2018-05-30 | End: 2018-05-31

## 2018-05-30 RX ORDER — ATORVASTATIN CALCIUM 80 MG/1
20 TABLET, FILM COATED ORAL AT BEDTIME
Qty: 0 | Refills: 0 | Status: DISCONTINUED | OUTPATIENT
Start: 2018-05-30 | End: 2018-05-31

## 2018-05-30 RX ORDER — ASPIRIN/CALCIUM CARB/MAGNESIUM 324 MG
81 TABLET ORAL DAILY
Qty: 0 | Refills: 0 | Status: DISCONTINUED | OUTPATIENT
Start: 2018-05-30 | End: 2018-05-31

## 2018-05-30 RX ADMIN — ENOXAPARIN SODIUM 40 MILLIGRAM(S): 100 INJECTION SUBCUTANEOUS at 10:56

## 2018-05-30 RX ADMIN — ISOSORBIDE MONONITRATE 30 MILLIGRAM(S): 60 TABLET, EXTENDED RELEASE ORAL at 18:09

## 2018-05-30 RX ADMIN — SODIUM CHLORIDE 3 MILLILITER(S): 9 INJECTION INTRAMUSCULAR; INTRAVENOUS; SUBCUTANEOUS at 05:04

## 2018-05-30 RX ADMIN — SODIUM CHLORIDE 3 MILLILITER(S): 9 INJECTION INTRAMUSCULAR; INTRAVENOUS; SUBCUTANEOUS at 13:14

## 2018-05-30 RX ADMIN — SODIUM CHLORIDE 3 MILLILITER(S): 9 INJECTION INTRAMUSCULAR; INTRAVENOUS; SUBCUTANEOUS at 23:02

## 2018-05-30 RX ADMIN — ATORVASTATIN CALCIUM 20 MILLIGRAM(S): 80 TABLET, FILM COATED ORAL at 23:02

## 2018-05-30 RX ADMIN — Medication 81 MILLIGRAM(S): at 10:51

## 2018-05-30 RX ADMIN — LOSARTAN POTASSIUM 25 MILLIGRAM(S): 100 TABLET, FILM COATED ORAL at 05:05

## 2018-05-30 NOTE — H&P ADULT - ATTENDING COMMENTS
Patient seen and examined independently. Agree with resident note with exceptions.   Pt presented with c/o palpitations associated with dizziness, chest pain/pressure and sob.  1. Palpitation / chestpain with dizziness  - C/w cardiac monitoring.  - cardiac enzymes neg x2.  -EKG reveals NSR @84 with LVH  -F/u 2 D echo  - F/u TSH  - cardiac eval.  - C/w ASA, statin start imdur.    2. H/o HTN  - C/w losartan    3. DVT prophylxis

## 2018-05-30 NOTE — H&P ADULT - HISTORY OF PRESENT ILLNESS
63 y/o male with past history of HTN presents with a few weeks of palpitations and shortness of breath. Patient reports that he feels "electric pulsating shocks" across his chest which comes and goes, worsened by laying flat.   Patient also reports sternal pain which radiates up into his neck and sometimes results in numbness and tingling in his fingers.   He reports that all of this started after he took one of his wife's 25mg beta blockers by accident. Patient takes aspirin, losartan and statin as home meds, but reportedly does not take any other medications, denies smoking or alcohol use.   Endorses one episode of sweats, one episode of diarrhea, urinary frequency, shortness of breath. Denies all other symptoms.   Patient went to see his PMD today, Dr. Young who sent him here for observation.    in ED:  /  ml  bolus x 1 / pepcid 20 IV x 1 63 y/o male with past history of HTN, DL, presents with palpations and SOB of few weeks' duration.   pt reports that he gets and electric like / pulsating shock of sharp CP along with the palpitations a/w occasional lightheadedness.   reports SOB at rest and on exertion regardless of position.  pt denies having any similar symptoms in the past and states that all his symptoms are new. no excessive alcohol use, no smoking or drug use.  Patient went to see his PMD today, Dr. Young who sent him to the ED for observation.  ROS otherwise negative.    in ED:  /  ml  bolus x 1 / pepcid 20 IV x 1

## 2018-05-30 NOTE — H&P ADULT - NSHPPHYSICALEXAM_GEN_ALL_CORE
T(C): 36.6 (05-29-18 @ 17:18), Max: 36.6 (05-29-18 @ 17:18)  HR: 90 (05-29-18 @ 17:18) (90 - 90)  BP: 134/84 (05-29-18 @ 17:18) (134/84 - 134/84)  RR: 18 (05-29-18 @ 17:18) (18 - 18)  SpO2: 98% (05-29-18 @ 17:18) (98% - 98%)    PHYSICAL EXAM:    GENERAL: NAD, well-developed  HEAD:  Atraumatic, Normocephalic  EYES: EOMI, PERRLA, conjunctiva and sclera clear  NECK: Supple, No JVD  CHEST/LUNG: Clear to auscultation bilaterally; No wheeze  HEART: Regular rate and rhythm; No murmurs, rubs, or gallops  ABDOMEN: Soft, Nontender, Nondistended; Bowel sounds present  EXTREMITIES:  2+ Peripheral Pulses, No clubbing, cyanosis, or edema  PSYCH: AAOx3  NEUROLOGY: non-focal  SKIN: No rashes or lesions T(C): 36.6 (05-29-18 @ 17:18), Max: 36.6 (05-29-18 @ 17:18)  HR: 90 (05-29-18 @ 17:18) (90 - 90)  BP: 134/84 (05-29-18 @ 17:18) (134/84 - 134/84)  RR: 18 (05-29-18 @ 17:18) (18 - 18)  SpO2: 98% (05-29-18 @ 17:18) (98% - 98%)    PHYSICAL EXAM:    GENERAL: NAD, well-developed  HEAD:  Atraumatic, Normocephalic  CHEST/LUNG: Clear to auscultation bilaterally; No wheeze  HEART: Regular rate and rhythm; No murmurs, rubs, or gallops  ABDOMEN: Soft, Nontender, Nondistended; Bowel sounds present  EXTREMITIES:  2+ Peripheral Pulses, No clubbing, cyanosis, or edema  PSYCH: AAOx3  NEUROLOGY: non-focal

## 2018-05-30 NOTE — H&P ADULT - ASSESSMENT
63 y/o male with past history of HTN, DL, presents with palpations and SOB of few weeks' duration.  pt sent by PMD for evaluation of above complains.  recent hospital admission in 61 y/o male with past history of HTN, DL, presents with palpations and SOB of few weeks' duration.  pt sent by PMD for evaluation of above complains.  recent hospital admission 5/10-5/11/2018 w/ neg cCTA.  EKG today is NSR with no acute ST, Q or T wave abnormalities, however + criteria for LVH in limb leads (R wave in lead I + S wave in lead III > 25 mm / R wave in aVL > 11 mm)    1- palpitations / non-specific CP:  admit to telemetry x 24 h  unlikely ACS given above findings  check 2D echo (can be done as OP)  check 2nd set CE, TSH  consider cariology evaluation (can be done as OP)  c/w ASA 81 q24    2- HTN / DL: c/w losartan & lipitor with home dose    3- GI ppx: no need  DVT ppx: lovenox  full code  consider d/c in am for OP cardiology follow up if workup is negative

## 2018-05-30 NOTE — H&P ADULT - NSHPLABSRESULTS_GEN_ALL_CORE
16.3   9.78  )-----------( 181      ( 29 May 2018 20:43 )             46.4           141  |  101  |  12  ----------------------------<  103<H>  4.0   |  26  |  1.0    Ca    9.1      29 May 2018 20:43    TPro  6.7  /  Alb  4.0  /  TBili  0.5  /  DBili  x   /  AST  23  /  ALT  30  /  AlkPhos  46          Urinalysis Basic - ( 29 May 2018 22:36 )    Color: Yellow / Appearance: Clear / S.010 / pH: x  Gluc: x / Ketone: Negative  / Bili: Negative / Urobili: 0.2 mg/dL   Blood: x / Protein: Negative mg/dL / Nitrite: Negative   Leuk Esterase: Negative / RBC: x / WBC x   Sq Epi: x / Non Sq Epi: x / Bacteria: x      CARDIAC MARKERS ( 29 May 2018 20:43 )  x     / <0.01 ng/mL / 207 U/L / x     / 4.8 ng/mL        < from: CT Heart with Coronaries (18 @ 11:47) >    IMPRESSION:  1.  Luminal irregularity of proximal left anterior descending coronary   artery, secondary to calcified plaques. No evidence of flow-limiting   stenosis of the coronary arteries.  2. There is scattered calcified plaques in the distal left main and in   the proximal left anterior descending coronary arteries, which are not   dense to meet the criteria of 130 Hounsfield for Agatston calcium scoring   system.    < end of copied text > 16.3   9.78  )-----------( 181      ( 29 May 2018 20:43 )             46.4           141  |  101  |  12  ----------------------------<  103<H>  4.0   |  26  |  1.0    Ca    9.1      29 May 2018 20:43    TPro  6.7  /  Alb  4.0  /  TBili  0.5  /  DBili  x   /  AST  23  /  ALT  30  /  AlkPhos  46          Urinalysis Basic - ( 29 May 2018 22:36 )    Color: Yellow / Appearance: Clear / S.010 / pH: x  Gluc: x / Ketone: Negative  / Bili: Negative / Urobili: 0.2 mg/dL   Blood: x / Protein: Negative mg/dL / Nitrite: Negative   Leuk Esterase: Negative / RBC: x / WBC x   Sq Epi: x / Non Sq Epi: x / Bacteria: x      CARDIAC MARKERS ( 29 May 2018 20:43 )  x     / <0.01 ng/mL / 207 U/L / x     / 4.8 ng/mL      CXR (unofficial): no acute cardioulmonary pathology      < from: CT Heart with Coronaries (18 @ 11:47) >    IMPRESSION:  1.  Luminal irregularity of proximal left anterior descending coronary   artery, secondary to calcified plaques. No evidence of flow-limiting   stenosis of the coronary arteries.  2. There is scattered calcified plaques in the distal left main and in   the proximal left anterior descending coronary arteries, which are not   dense to meet the criteria of 130 Hounsfield for Agatston calcium scoring   system.    < end of copied text >

## 2018-05-31 ENCOUNTER — TRANSCRIPTION ENCOUNTER (OUTPATIENT)
Age: 63
End: 2018-05-31

## 2018-05-31 VITALS
TEMPERATURE: 98 F | HEART RATE: 80 BPM | SYSTOLIC BLOOD PRESSURE: 116 MMHG | WEIGHT: 199.96 LBS | HEIGHT: 67 IN | RESPIRATION RATE: 18 BRPM | DIASTOLIC BLOOD PRESSURE: 65 MMHG

## 2018-05-31 LAB
CULTURE RESULTS: NO GROWTH — SIGNIFICANT CHANGE UP
SPECIMEN SOURCE: SIGNIFICANT CHANGE UP

## 2018-05-31 RX ORDER — LOSARTAN POTASSIUM 100 MG/1
1 TABLET, FILM COATED ORAL
Qty: 0 | Refills: 0 | DISCHARGE
Start: 2018-05-31

## 2018-05-31 RX ORDER — LOSARTAN POTASSIUM 100 MG/1
0 TABLET, FILM COATED ORAL
Qty: 30 | Refills: 0 | COMMUNITY

## 2018-05-31 RX ORDER — ATORVASTATIN CALCIUM 80 MG/1
1 TABLET, FILM COATED ORAL
Qty: 0 | Refills: 0 | DISCHARGE
Start: 2018-05-31

## 2018-05-31 RX ORDER — FAMOTIDINE 10 MG/ML
1 INJECTION INTRAVENOUS
Qty: 60 | Refills: 0
Start: 2018-05-31 | End: 2018-06-29

## 2018-05-31 RX ORDER — ISOSORBIDE MONONITRATE 60 MG/1
1 TABLET, EXTENDED RELEASE ORAL
Qty: 30 | Refills: 0
Start: 2018-05-31 | End: 2018-06-29

## 2018-05-31 RX ORDER — ACETAMINOPHEN 500 MG
650 TABLET ORAL ONCE
Qty: 0 | Refills: 0 | Status: COMPLETED | OUTPATIENT
Start: 2018-05-31 | End: 2018-05-31

## 2018-05-31 RX ORDER — ASPIRIN/CALCIUM CARB/MAGNESIUM 324 MG
1 TABLET ORAL
Qty: 0 | Refills: 0 | DISCHARGE
Start: 2018-05-31

## 2018-05-31 RX ORDER — SIMETHICONE 80 MG/1
80 TABLET, CHEWABLE ORAL
Qty: 0 | Refills: 0 | Status: DISCONTINUED | OUTPATIENT
Start: 2018-05-31 | End: 2018-05-31

## 2018-05-31 RX ADMIN — LOSARTAN POTASSIUM 25 MILLIGRAM(S): 100 TABLET, FILM COATED ORAL at 06:50

## 2018-05-31 RX ADMIN — SODIUM CHLORIDE 3 MILLILITER(S): 9 INJECTION INTRAMUSCULAR; INTRAVENOUS; SUBCUTANEOUS at 06:49

## 2018-05-31 RX ADMIN — SODIUM CHLORIDE 3 MILLILITER(S): 9 INJECTION INTRAMUSCULAR; INTRAVENOUS; SUBCUTANEOUS at 13:52

## 2018-05-31 RX ADMIN — ENOXAPARIN SODIUM 40 MILLIGRAM(S): 100 INJECTION SUBCUTANEOUS at 11:19

## 2018-05-31 RX ADMIN — Medication 650 MILLIGRAM(S): at 00:18

## 2018-05-31 RX ADMIN — Medication 81 MILLIGRAM(S): at 11:20

## 2018-05-31 RX ADMIN — ISOSORBIDE MONONITRATE 30 MILLIGRAM(S): 60 TABLET, EXTENDED RELEASE ORAL at 11:20

## 2018-05-31 NOTE — DISCHARGE NOTE ADULT - CARE PLAN
Principal Discharge DX:	Palpitations  Goal:	Assessment and improvement in symptoms  Assessment and plan of treatment:	Please make an appointment with a cardiologist of your choosing. A number for a cardiologist has been provided for you.   Continue to take your medications as prescribed

## 2018-05-31 NOTE — DISCHARGE NOTE ADULT - MEDICATION SUMMARY - MEDICATIONS TO CHANGE
I will SWITCH the dose or number of times a day I take the medications listed below when I get home from the hospital:    amLODIPine 5 mg oral tablet  -- 1 tab(s) by mouth once a day   -- It is very important that you take or use this exactly as directed.  Do not skip doses or discontinue unless directed by your doctor.  Some non-prescription drugs may aggravate your condition.  Read all labels carefully.  If a warning appears, check with your doctor before taking.

## 2018-05-31 NOTE — DISCHARGE NOTE ADULT - PLAN OF CARE
Assessment and improvement in symptoms Please make an appointment with a cardiologist of your choosing. A number for a cardiologist has been provided for you.   Continue to take your medications as prescribed

## 2018-05-31 NOTE — PROGRESS NOTE ADULT - ASSESSMENT
Pt presented with c/o palpitations associated with dizziness, chest pain/pressure and sob.  1. Palpitation / chestpain with dizziness  - - cardiac enzymes neg x2.  -EKG reveals NSR @84 with LVH  - 2 D echo< from: Transthoracic Echocardiogram (05.30.18 @ 08:46) >   1. LV Ejection Fraction by Clark's Method with a biplane EF of 61 %.   2. Normal left ventricular size and wall thicknesses, with normal   systolic function.   3. Spectral Doppler shows impaired relaxation pattern of left   ventricular myocardial filling (Grade I diastolic dysfunction).   4. Mild tricuspid regurgitation.   5. Mild aortic regurgitation.   6. Pulmonic valve regurgitation.   7. Dilatation of the aortic root and ascending aorta.        -  TSH-3.34  - cardiac eval.  - C/w ASA, statin     2. H/o HTN  - C/w losartan  Dc home today, spent more than 30mins

## 2018-05-31 NOTE — DISCHARGE NOTE ADULT - CARE PROVIDER_API CALL
Jayden James), Cardiovascular Disease; Internal Medicine  [address] [place] [address]  Phone: [phone]  Fax: [phone]

## 2018-05-31 NOTE — DISCHARGE NOTE ADULT - PATIENT PORTAL LINK FT
You can access the [facility] Portal, offered by [facility], by registering with the following website: [email or web address]

## 2018-05-31 NOTE — PROGRESS NOTE ADULT - SUBJECTIVE AND OBJECTIVE BOX
SUBJECTIVE:    Patient is a 62y old Male who presents with a chief complaint of Palpitations and shortness of breath (30 May 2018 00:23)    Currently admitted to medicine with the primary diagnosis of Palpitations     Today is hospital day 2d. This morning he is resting comfortably in bed and reports no new issues or overnight events.     PAST MEDICAL & SURGICAL HISTORY  HTN (hypertension)  History of appendectomy    SOCIAL HISTORY:  Negative for smoking/alcohol/drug use.     ALLERGIES:  No Known Allergies    MEDICATIONS:  STANDING MEDICATIONS  aspirin enteric coated 81 milliGRAM(s) Oral daily  atorvastatin 20 milliGRAM(s) Oral at bedtime  enoxaparin Injectable 40 milliGRAM(s) SubCutaneous every 24 hours  isosorbide   mononitrate ER Tablet (IMDUR) 30 milliGRAM(s) Oral daily  losartan 25 milliGRAM(s) Enteral Tube daily  simethicone 80 milliGRAM(s) Chew four times a day  sodium chloride 0.9% lock flush 3 milliLiter(s) IV Push every 8 hours    PRN MEDICATIONS    VITALS:   T(F): 97.5  HR: 80  BP: 116/65  RR: 18  SpO2: 97%    LABS:                        15.3   7.84  )-----------( 169      ( 30 May 2018 05:06 )             43.7     05-30    141  |  103  |  12  ----------------------------<  98  3.9   |  24  |  0.9    Ca    8.8      30 May 2018 05:06  Mg     2.1     05-30    TPro  6.7  /  Alb  4.0  /  TBili  0.5  /  DBili  x   /  AST  23  /  ALT  30  /  AlkPhos  46  05-29      Urinalysis Basic - ( 29 May 2018 22:36 )    Color: Yellow / Appearance: Clear / S.010 / pH: x  Gluc: x / Ketone: Negative  / Bili: Negative / Urobili: 0.2 mg/dL   Blood: x / Protein: Negative mg/dL / Nitrite: Negative   Leuk Esterase: Negative / RBC: x / WBC x   Sq Epi: x / Non Sq Epi: x / Bacteria: x            Culture - Urine (collected 29 May 2018 22:36)  Source: .Urine Clean Catch (Midstream)  Final Report (31 May 2018 12:32):    No growth      CARDIAC MARKERS ( 30 May 2018 05:06 )  x     / <0.01 ng/mL / 157 U/L / x     / 3.8 ng/mL  CARDIAC MARKERS ( 29 May 2018 20:43 )  x     / <0.01 ng/mL / 207 U/L / x     / 4.8 ng/mL      RADIOLOGY:    PHYSICAL EXAM:  GEN: No acute distress  LUNGS: Clear to auscultation bilaterally   HEART: S1/S2 present. RRR.   ABD/ GI: Soft, non-tender, non-distended. Bowel sounds present  EXT: NC/NC/NE/2+PP/JACQUES  NEURO: AAOX3

## 2018-05-31 NOTE — DISCHARGE NOTE ADULT - HOSPITAL COURSE
62M with pmhx of HTN, and HLD presents to hospital for worsening palpitations, occasional SOB for 3 weeks. CCTA weeks prior showed no CAD. no events noted on telemetry, and no HD instability on admission. Patient was started on low dose Imdur with significant improvement in symptoms.

## 2018-06-05 DIAGNOSIS — R06.02 SHORTNESS OF BREATH: ICD-10-CM

## 2018-06-05 DIAGNOSIS — R00.2 PALPITATIONS: ICD-10-CM

## 2018-06-05 DIAGNOSIS — R07.9 CHEST PAIN, UNSPECIFIED: ICD-10-CM

## 2018-06-05 DIAGNOSIS — E78.5 HYPERLIPIDEMIA, UNSPECIFIED: ICD-10-CM

## 2018-06-05 DIAGNOSIS — Z98.890 OTHER SPECIFIED POSTPROCEDURAL STATES: ICD-10-CM

## 2018-06-05 DIAGNOSIS — R42 DIZZINESS AND GIDDINESS: ICD-10-CM

## 2018-06-05 DIAGNOSIS — I10 ESSENTIAL (PRIMARY) HYPERTENSION: ICD-10-CM

## 2018-07-13 ENCOUNTER — EMERGENCY (EMERGENCY)
Facility: HOSPITAL | Age: 63
LOS: 0 days | Discharge: HOME | End: 2018-07-13
Admitting: PHYSICIAN ASSISTANT

## 2018-07-13 VITALS
SYSTOLIC BLOOD PRESSURE: 147 MMHG | HEART RATE: 100 BPM | DIASTOLIC BLOOD PRESSURE: 99 MMHG | OXYGEN SATURATION: 99 % | RESPIRATION RATE: 18 BRPM | TEMPERATURE: 99 F

## 2018-07-13 DIAGNOSIS — R39.89 OTHER SYMPTOMS AND SIGNS INVOLVING THE GENITOURINARY SYSTEM: ICD-10-CM

## 2018-07-13 DIAGNOSIS — N40.0 BENIGN PROSTATIC HYPERPLASIA WITHOUT LOWER URINARY TRACT SYMPTOMS: ICD-10-CM

## 2018-07-13 DIAGNOSIS — I10 ESSENTIAL (PRIMARY) HYPERTENSION: ICD-10-CM

## 2018-07-13 DIAGNOSIS — R30.0 DYSURIA: ICD-10-CM

## 2018-07-13 DIAGNOSIS — Z79.899 OTHER LONG TERM (CURRENT) DRUG THERAPY: ICD-10-CM

## 2018-07-13 DIAGNOSIS — E78.00 PURE HYPERCHOLESTEROLEMIA, UNSPECIFIED: ICD-10-CM

## 2018-07-13 DIAGNOSIS — Z79.82 LONG TERM (CURRENT) USE OF ASPIRIN: ICD-10-CM

## 2018-07-13 DIAGNOSIS — Z98.890 OTHER SPECIFIED POSTPROCEDURAL STATES: Chronic | ICD-10-CM

## 2018-07-13 LAB
APPEARANCE UR: CLEAR — SIGNIFICANT CHANGE UP
BILIRUB UR-MCNC: NEGATIVE — SIGNIFICANT CHANGE UP
COLOR SPEC: YELLOW — SIGNIFICANT CHANGE UP
DIFF PNL FLD: NEGATIVE — SIGNIFICANT CHANGE UP
GLUCOSE UR QL: NEGATIVE MG/DL — SIGNIFICANT CHANGE UP
KETONES UR-MCNC: NEGATIVE — SIGNIFICANT CHANGE UP
LEUKOCYTE ESTERASE UR-ACNC: NEGATIVE — SIGNIFICANT CHANGE UP
NITRITE UR-MCNC: NEGATIVE — SIGNIFICANT CHANGE UP
PH UR: 6 — SIGNIFICANT CHANGE UP (ref 5–8)
PROT UR-MCNC: NEGATIVE MG/DL — SIGNIFICANT CHANGE UP
SP GR SPEC: 1.01 — SIGNIFICANT CHANGE UP (ref 1.01–1.03)
UROBILINOGEN FLD QL: 0.2 MG/DL — SIGNIFICANT CHANGE UP (ref 0.2–0.2)

## 2018-07-13 RX ORDER — MOXIFLOXACIN HYDROCHLORIDE TABLETS, 400 MG 400 MG/1
1 TABLET, FILM COATED ORAL
Qty: 14 | Refills: 0
Start: 2018-07-13 | End: 2018-07-19

## 2018-07-13 NOTE — ED ADULT NURSE NOTE - OBJECTIVE STATEMENT
patient presents to ED for evaluation of dark colored urine, frequency, burning, dribbling when voiding, diarrhea and abdominal discomfort which began today.

## 2018-07-13 NOTE — ED PROVIDER NOTE - NS ED ROS FT
Constitutional: no fever, chills, no recent weight loss, change in appetite or malaise  Eyes: no redness/discharge/pain/vision changes  ENT: no rhinorrhea/ear pain/sore throat  Cardiac: No chest pain, SOB or edema.  Respiratory: No cough or respiratory distress  GI: No nausea, vomiting, diarrhea or abdominal pain.  : No hematuria  MS: no pain to back or extremities, no loss of ROM, no weakness  Neuro: No headache or weakness. No LOC.  Skin: No skin rash.  Endocrine: No history of thyroid disease or diabetes.  Except as documented in the HPI, all other systems are negative.

## 2018-07-13 NOTE — ED PROVIDER NOTE - OBJECTIVE STATEMENT
pt sts took one dose of losartan and that evening (yesterday) began experiencing bladder pressure and urinary hesitancy with decreased volume. He was able to void, but small amount and dysuria.  no std exposure or h/o uti  no n/v/f/c, hematuria, etc  woke this AM with same sxs, bladder pressure with occasional "twinge" in testicles  no other sxs

## 2018-07-14 LAB
CULTURE RESULTS: NO GROWTH — SIGNIFICANT CHANGE UP
SPECIMEN SOURCE: SIGNIFICANT CHANGE UP

## 2018-07-16 LAB
C TRACH RRNA SPEC QL NAA+PROBE: SIGNIFICANT CHANGE UP
N GONORRHOEA RRNA SPEC QL NAA+PROBE: SIGNIFICANT CHANGE UP
SPECIMEN SOURCE: SIGNIFICANT CHANGE UP

## 2018-08-29 ENCOUNTER — EMERGENCY (EMERGENCY)
Facility: HOSPITAL | Age: 63
LOS: 0 days | Discharge: HOME | End: 2018-08-29
Attending: EMERGENCY MEDICINE | Admitting: EMERGENCY MEDICINE

## 2018-08-29 VITALS
HEIGHT: 67 IN | HEART RATE: 86 BPM | WEIGHT: 205.03 LBS | SYSTOLIC BLOOD PRESSURE: 175 MMHG | RESPIRATION RATE: 18 BRPM | DIASTOLIC BLOOD PRESSURE: 104 MMHG | OXYGEN SATURATION: 97 % | TEMPERATURE: 97 F

## 2018-08-29 VITALS
RESPIRATION RATE: 18 BRPM | HEART RATE: 76 BPM | SYSTOLIC BLOOD PRESSURE: 160 MMHG | DIASTOLIC BLOOD PRESSURE: 104 MMHG | TEMPERATURE: 98 F | OXYGEN SATURATION: 97 %

## 2018-08-29 DIAGNOSIS — R10.31 RIGHT LOWER QUADRANT PAIN: ICD-10-CM

## 2018-08-29 DIAGNOSIS — Z79.82 LONG TERM (CURRENT) USE OF ASPIRIN: ICD-10-CM

## 2018-08-29 DIAGNOSIS — Z79.01 LONG TERM (CURRENT) USE OF ANTICOAGULANTS: ICD-10-CM

## 2018-08-29 DIAGNOSIS — I10 ESSENTIAL (PRIMARY) HYPERTENSION: ICD-10-CM

## 2018-08-29 DIAGNOSIS — E78.00 PURE HYPERCHOLESTEROLEMIA, UNSPECIFIED: ICD-10-CM

## 2018-08-29 DIAGNOSIS — Z79.899 OTHER LONG TERM (CURRENT) DRUG THERAPY: ICD-10-CM

## 2018-08-29 DIAGNOSIS — Z98.890 OTHER SPECIFIED POSTPROCEDURAL STATES: Chronic | ICD-10-CM

## 2018-08-29 DIAGNOSIS — Z90.49 ACQUIRED ABSENCE OF OTHER SPECIFIED PARTS OF DIGESTIVE TRACT: ICD-10-CM

## 2018-08-29 LAB
ALBUMIN SERPL ELPH-MCNC: 4.1 G/DL — SIGNIFICANT CHANGE UP (ref 3.5–5.2)
ALP SERPL-CCNC: 39 U/L — SIGNIFICANT CHANGE UP (ref 30–115)
ALT FLD-CCNC: 23 U/L — SIGNIFICANT CHANGE UP (ref 0–41)
ANION GAP SERPL CALC-SCNC: 10 MMOL/L — SIGNIFICANT CHANGE UP (ref 7–14)
APPEARANCE UR: CLEAR — SIGNIFICANT CHANGE UP
AST SERPL-CCNC: 39 U/L — SIGNIFICANT CHANGE UP (ref 0–41)
BACTERIA # UR AUTO: ABNORMAL
BASOPHILS # BLD AUTO: 0.07 K/UL — SIGNIFICANT CHANGE UP (ref 0–0.2)
BASOPHILS NFR BLD AUTO: 0.9 % — SIGNIFICANT CHANGE UP (ref 0–1)
BILIRUB SERPL-MCNC: 0.5 MG/DL — SIGNIFICANT CHANGE UP (ref 0.2–1.2)
BILIRUB UR-MCNC: NEGATIVE — SIGNIFICANT CHANGE UP
BUN SERPL-MCNC: 17 MG/DL — SIGNIFICANT CHANGE UP (ref 10–20)
CALCIUM SERPL-MCNC: 9.4 MG/DL — SIGNIFICANT CHANGE UP (ref 8.5–10.1)
CHLORIDE SERPL-SCNC: 103 MMOL/L — SIGNIFICANT CHANGE UP (ref 98–110)
CO2 SERPL-SCNC: 27 MMOL/L — SIGNIFICANT CHANGE UP (ref 17–32)
COD CRY URNS QL: NEGATIVE — SIGNIFICANT CHANGE UP
COLOR SPEC: YELLOW — SIGNIFICANT CHANGE UP
CREAT SERPL-MCNC: 1 MG/DL — SIGNIFICANT CHANGE UP (ref 0.7–1.5)
DIFF PNL FLD: ABNORMAL
EOSINOPHIL # BLD AUTO: 0.06 K/UL — SIGNIFICANT CHANGE UP (ref 0–0.7)
EOSINOPHIL NFR BLD AUTO: 0.8 % — SIGNIFICANT CHANGE UP (ref 0–8)
EPI CELLS # UR: ABNORMAL /HPF
GLUCOSE SERPL-MCNC: 115 MG/DL — HIGH (ref 70–99)
GLUCOSE UR QL: NEGATIVE MG/DL — SIGNIFICANT CHANGE UP
GRAN CASTS # UR COMP ASSIST: NEGATIVE — SIGNIFICANT CHANGE UP
HCT VFR BLD CALC: 45 % — SIGNIFICANT CHANGE UP (ref 42–52)
HGB BLD-MCNC: 16.1 G/DL — SIGNIFICANT CHANGE UP (ref 14–18)
HYALINE CASTS # UR AUTO: NEGATIVE — SIGNIFICANT CHANGE UP
IMM GRANULOCYTES NFR BLD AUTO: 0.6 % — HIGH (ref 0.1–0.3)
KETONES UR-MCNC: NEGATIVE — SIGNIFICANT CHANGE UP
LACTATE SERPL-SCNC: 1.4 MMOL/L — SIGNIFICANT CHANGE UP (ref 0.5–2.2)
LEUKOCYTE ESTERASE UR-ACNC: NEGATIVE — SIGNIFICANT CHANGE UP
LIDOCAIN IGE QN: 33 U/L — SIGNIFICANT CHANGE UP (ref 7–60)
LYMPHOCYTES # BLD AUTO: 1.45 K/UL — SIGNIFICANT CHANGE UP (ref 1.2–3.4)
LYMPHOCYTES # BLD AUTO: 18.3 % — LOW (ref 20.5–51.1)
MCHC RBC-ENTMCNC: 31.1 PG — HIGH (ref 27–31)
MCHC RBC-ENTMCNC: 35.8 G/DL — SIGNIFICANT CHANGE UP (ref 32–37)
MCV RBC AUTO: 87 FL — SIGNIFICANT CHANGE UP (ref 80–94)
MONOCYTES # BLD AUTO: 0.5 K/UL — SIGNIFICANT CHANGE UP (ref 0.1–0.6)
MONOCYTES NFR BLD AUTO: 6.3 % — SIGNIFICANT CHANGE UP (ref 1.7–9.3)
NEUTROPHILS # BLD AUTO: 5.8 K/UL — SIGNIFICANT CHANGE UP (ref 1.4–6.5)
NEUTROPHILS NFR BLD AUTO: 73.1 % — SIGNIFICANT CHANGE UP (ref 42.2–75.2)
NITRITE UR-MCNC: NEGATIVE — SIGNIFICANT CHANGE UP
NRBC # BLD: 0 /100 WBCS — SIGNIFICANT CHANGE UP (ref 0–0)
PH UR: 6.5 — SIGNIFICANT CHANGE UP (ref 5–8)
PLATELET # BLD AUTO: 192 K/UL — SIGNIFICANT CHANGE UP (ref 130–400)
POTASSIUM SERPL-MCNC: 5.7 MMOL/L — HIGH (ref 3.5–5)
POTASSIUM SERPL-SCNC: 5.7 MMOL/L — HIGH (ref 3.5–5)
PROT SERPL-MCNC: 6.8 G/DL — SIGNIFICANT CHANGE UP (ref 6–8)
PROT UR-MCNC: NEGATIVE MG/DL — SIGNIFICANT CHANGE UP
RBC # BLD: 5.17 M/UL — SIGNIFICANT CHANGE UP (ref 4.7–6.1)
RBC # FLD: 12 % — SIGNIFICANT CHANGE UP (ref 11.5–14.5)
RBC CASTS # UR COMP ASSIST: SIGNIFICANT CHANGE UP /HPF
SODIUM SERPL-SCNC: 140 MMOL/L — SIGNIFICANT CHANGE UP (ref 135–146)
SP GR SPEC: 1.01 — SIGNIFICANT CHANGE UP (ref 1.01–1.03)
TRI-PHOS CRY UR QL COMP ASSIST: NEGATIVE — SIGNIFICANT CHANGE UP
URATE CRY FLD QL MICRO: NEGATIVE — SIGNIFICANT CHANGE UP
UROBILINOGEN FLD QL: 0.2 MG/DL — SIGNIFICANT CHANGE UP (ref 0.2–0.2)
WBC # BLD: 7.93 K/UL — SIGNIFICANT CHANGE UP (ref 4.8–10.8)
WBC # FLD AUTO: 7.93 K/UL — SIGNIFICANT CHANGE UP (ref 4.8–10.8)
WBC UR QL: SIGNIFICANT CHANGE UP /HPF

## 2018-08-29 RX ORDER — SODIUM CHLORIDE 9 MG/ML
3 INJECTION INTRAMUSCULAR; INTRAVENOUS; SUBCUTANEOUS EVERY 8 HOURS
Qty: 0 | Refills: 0 | Status: DISCONTINUED | OUTPATIENT
Start: 2018-08-29 | End: 2018-08-29

## 2018-08-29 RX ORDER — IOHEXOL 300 MG/ML
30 INJECTION, SOLUTION INTRAVENOUS ONCE
Qty: 0 | Refills: 0 | Status: COMPLETED | OUTPATIENT
Start: 2018-08-29 | End: 2018-08-29

## 2018-08-29 RX ADMIN — IOHEXOL 30 MILLILITER(S): 300 INJECTION, SOLUTION INTRAVENOUS at 14:53

## 2018-08-29 NOTE — ED PROVIDER NOTE - MEDICAL DECISION MAKING DETAILS
Results reviewed and d/w patient.  Will; follow up with PMD and retrun if worse.  Pt instructed to drink plenty of fluids post IV dye.

## 2018-08-29 NOTE — ED PROVIDER NOTE - ATTENDING CONTRIBUTION TO CARE
63 yo M PMHx noted including appendic 63 yo M PMHx noted including appendicitis, states treated with abx in the past presents with c/o intermittent abd pain , right sided x few days.  Seen by PMD and sent in for further evaluation.  no n/v/d, no fever or chills,.  no urinary complaints.  On exam pt in NAD AAO x 3, Abd is soft + BS, ND, NT

## 2018-08-29 NOTE — ED ADULT NURSE NOTE - IN THE PAST 12 MONTHS HAVE YOU USED DRUGS OTHER THAN THOSE REQUIRED FOR MEDICAL REASON?
WORK ABSENT EXCUSE    2/21/2018      Sondra Carty  [address] [facility] [address]      Re: Sondra Carty         1996      To Whom it May Concern:    This is to certify that Sondra Carty was seen in the [facility] today 2/21/2018 and is medically excused from work today through 2/23/2018. It is anticipated she will be able to return to work 2/26/2018.           If you have any questions, please do not hesitate to call my office at [phone].      Sincerely,          Sujatha Ellison, DO  [facility]  [address] [place] [address] [phone]            
No

## 2018-08-29 NOTE — ED ADULT NURSE NOTE - NSIMPLEMENTINTERV_GEN_ALL_ED
Implemented All Universal Safety Interventions:  [place] to call system. Call bell, personal items and telephone within reach. Instruct patient to call for assistance. Room bathroom lighting operational. Non-slip footwear when patient is off stretcher. Physically safe environment: no spills, clutter or unnecessary equipment. Stretcher in lowest position, wheels locked, appropriate side rails in place.

## 2018-11-13 NOTE — ED ADULT NURSE NOTE - HEIGHT IN FEET
I spoke with patient and informed her that these eye drops seem to be OTC eye drops so she will check with her insurance to find out if these are covered. If they are then we will have to write a paper script and she will have to come to the clinic to pick it up.    5

## 2019-02-12 NOTE — ED ADULT TRIAGE NOTE - CHIEF COMPLAINT QUOTE
Inform client of normal cbc with diff     Pt reports feeling tingling all over so had BP taken at work and was found to be high. Denies history of hypertension.

## 2020-03-20 ENCOUNTER — INPATIENT (INPATIENT)
Facility: HOSPITAL | Age: 65
LOS: 2 days | Discharge: HOME HEALTH | End: 2020-03-23
Attending: INTERNAL MEDICINE | Admitting: INTERNAL MEDICINE
Payer: COMMERCIAL

## 2020-03-20 VITALS
OXYGEN SATURATION: 98 % | TEMPERATURE: 99 F | HEART RATE: 86 BPM | RESPIRATION RATE: 20 BRPM | WEIGHT: 210.1 LBS | DIASTOLIC BLOOD PRESSURE: 101 MMHG | SYSTOLIC BLOOD PRESSURE: 199 MMHG

## 2020-03-20 DIAGNOSIS — Z98.890 OTHER SPECIFIED POSTPROCEDURAL STATES: Chronic | ICD-10-CM

## 2020-03-20 LAB
ALBUMIN SERPL ELPH-MCNC: 3.8 G/DL — SIGNIFICANT CHANGE UP (ref 3.5–5.2)
ALP SERPL-CCNC: 61 U/L — SIGNIFICANT CHANGE UP (ref 30–115)
ALT FLD-CCNC: 35 U/L — SIGNIFICANT CHANGE UP (ref 0–41)
ANION GAP SERPL CALC-SCNC: 10 MMOL/L — SIGNIFICANT CHANGE UP (ref 7–14)
AST SERPL-CCNC: 27 U/L — SIGNIFICANT CHANGE UP (ref 0–41)
BASOPHILS # BLD AUTO: 0.1 K/UL — SIGNIFICANT CHANGE UP (ref 0–0.2)
BASOPHILS NFR BLD AUTO: 0.9 % — SIGNIFICANT CHANGE UP (ref 0–1)
BILIRUB SERPL-MCNC: 0.4 MG/DL — SIGNIFICANT CHANGE UP (ref 0.2–1.2)
BUN SERPL-MCNC: 23 MG/DL — HIGH (ref 10–20)
CALCIUM SERPL-MCNC: 9.6 MG/DL — SIGNIFICANT CHANGE UP (ref 8.5–10.1)
CHLORIDE SERPL-SCNC: 104 MMOL/L — SIGNIFICANT CHANGE UP (ref 98–110)
CO2 SERPL-SCNC: 26 MMOL/L — SIGNIFICANT CHANGE UP (ref 17–32)
CREAT SERPL-MCNC: 1.5 MG/DL — SIGNIFICANT CHANGE UP (ref 0.7–1.5)
EOSINOPHIL # BLD AUTO: 0.35 K/UL — SIGNIFICANT CHANGE UP (ref 0–0.7)
EOSINOPHIL NFR BLD AUTO: 3.3 % — SIGNIFICANT CHANGE UP (ref 0–8)
GLUCOSE SERPL-MCNC: 153 MG/DL — HIGH (ref 70–99)
HCT VFR BLD CALC: 46 % — SIGNIFICANT CHANGE UP (ref 42–52)
HGB BLD-MCNC: 15.9 G/DL — SIGNIFICANT CHANGE UP (ref 14–18)
IMM GRANULOCYTES NFR BLD AUTO: 0.9 % — HIGH (ref 0.1–0.3)
LYMPHOCYTES # BLD AUTO: 2.63 K/UL — SIGNIFICANT CHANGE UP (ref 1.2–3.4)
LYMPHOCYTES # BLD AUTO: 24.5 % — SIGNIFICANT CHANGE UP (ref 20.5–51.1)
MCHC RBC-ENTMCNC: 30.7 PG — SIGNIFICANT CHANGE UP (ref 27–31)
MCHC RBC-ENTMCNC: 34.6 G/DL — SIGNIFICANT CHANGE UP (ref 32–37)
MCV RBC AUTO: 88.8 FL — SIGNIFICANT CHANGE UP (ref 80–94)
MONOCYTES # BLD AUTO: 0.79 K/UL — HIGH (ref 0.1–0.6)
MONOCYTES NFR BLD AUTO: 7.4 % — SIGNIFICANT CHANGE UP (ref 1.7–9.3)
NEUTROPHILS # BLD AUTO: 6.75 K/UL — HIGH (ref 1.4–6.5)
NEUTROPHILS NFR BLD AUTO: 63 % — SIGNIFICANT CHANGE UP (ref 42.2–75.2)
NRBC # BLD: 0 /100 WBCS — SIGNIFICANT CHANGE UP (ref 0–0)
PLATELET # BLD AUTO: 199 K/UL — SIGNIFICANT CHANGE UP (ref 130–400)
POTASSIUM SERPL-MCNC: 4.2 MMOL/L — SIGNIFICANT CHANGE UP (ref 3.5–5)
POTASSIUM SERPL-SCNC: 4.2 MMOL/L — SIGNIFICANT CHANGE UP (ref 3.5–5)
PROT SERPL-MCNC: 7 G/DL — SIGNIFICANT CHANGE UP (ref 6–8)
RBC # BLD: 5.18 M/UL — SIGNIFICANT CHANGE UP (ref 4.7–6.1)
RBC # FLD: 12 % — SIGNIFICANT CHANGE UP (ref 11.5–14.5)
SODIUM SERPL-SCNC: 140 MMOL/L — SIGNIFICANT CHANGE UP (ref 135–146)
TROPONIN T SERPL-MCNC: <0.01 NG/ML — SIGNIFICANT CHANGE UP
WBC # BLD: 10.72 K/UL — SIGNIFICANT CHANGE UP (ref 4.8–10.8)
WBC # FLD AUTO: 10.72 K/UL — SIGNIFICANT CHANGE UP (ref 4.8–10.8)

## 2020-03-20 PROCEDURE — 99285 EMERGENCY DEPT VISIT HI MDM: CPT

## 2020-03-20 PROCEDURE — 93970 EXTREMITY STUDY: CPT | Mod: 26

## 2020-03-20 PROCEDURE — 71046 X-RAY EXAM CHEST 2 VIEWS: CPT | Mod: 26

## 2020-03-20 NOTE — ED ADULT NURSE NOTE - NSIMPLEMENTINTERV_GEN_ALL_ED
Implemented All Universal Safety Interventions:  [place] to call system. Call bell, personal items and telephone within reach. Instruct patient to call for assistance. Room bathroom lighting operational. Non-slip footwear when patient is off stretcher. Physically safe environment: no spills, clutter or unnecessary equipment. Stretcher in lowest position, wheels locked, appropriate side rails in place.

## 2020-03-20 NOTE — ED PROVIDER NOTE - CLINICAL SUMMARY MEDICAL DECISION MAKING FREE TEXT BOX
64M p/w rle swelling and cp x 1 day. Vitals reviewed to be wnl. Physical-nad,perrl,mmm,rrr,ctab,abd softntnd,fromx4- rle swelling- distal pulses present,anox3. sent of for U/S- + for RLE EVT- concern for PE- CTPE- + for r pulm artery pe- given Lovenox. admitted to medicine.

## 2020-03-20 NOTE — ED PROVIDER NOTE - NS ED ROS FT
Constitutional: (-) fever  Eyes/ENT: (-) visual changes   Cardiovascular: (-) chest pain, (-) syncope  Respiratory: (-) cough, (-) shortness of breath  Gastrointestinal: (-) vomiting, (-) diarrhea  Genitourinary: (-) dysuria, (-) hesitancy, (-) frequency   Musculoskeletal: (-) neck pain, (-) back pain, (-) joint pain  Integumentary: (-) rash, (-) edema  Neurological: (-) headache, (-) altered mental status  Allergic/Immunologic: (-) pruritus Constitutional: (-) fever  Eyes/ENT: (-) visual changes   Cardiovascular: (+) chest pain, (-) syncope  Respiratory: (-) cough, (+) shortness of breath  Gastrointestinal: (-) vomiting, (-) diarrhea  Genitourinary: (-) dysuria, (-) hesitancy, (-) frequency   Musculoskeletal: (-) neck pain, (-) back pain, (-) joint pain, RLE/calf pain  Integumentary: (-) rash, (-) edema  Neurological: (-) headache, (-) altered mental status  Allergic/Immunologic: (-) pruritus

## 2020-03-20 NOTE — ED PROVIDER NOTE - PROGRESS NOTE DETAILS
vas preliminary is positive for R popiteal and peroneal dvt vas preliminary is positive for R popiteal and peroneal dvt. will order chest xray to eval for PE Dominique- Received signout from BEAU Guevara pending CTA to r/o PE. Patient found to have PE in R main pulmonary artery and bilateral basilar arteries, +R popliteal and R peroneal DVT. Will give Lovenox SC. Discussed with Dr. Saab, hospitalist. Agrees with plan of care.

## 2020-03-20 NOTE — ED PROVIDER NOTE - OBJECTIVE STATEMENT
65 yo male with pmh of HTN and HLD presents c/o RLE pain/edema for one day. pt also notes intermittent SOB and chest pain. denies any other symptoms including fevers, chill, headache, recent illness/travel, cough, or abdominal pain.

## 2020-03-20 NOTE — ED PROVIDER NOTE - PHYSICAL EXAMINATION
Gen: NAD, AOx3  Head: NCAT  HEENT: PERRL, oral mucosa moist, normal conjunctiva, oropharynx clear without exudate or erythema  Lung: CTAB, no respiratory distress, no wheezing, rales, rhonchi  CV: normal s1/s2, rrr, Normal perfusion, pulses 2+ throughout  Abd: soft, NTND, no CVA tenderness  MSK: no visible deformities, full range of motion in all 4 extremities, RLE calf tenderness with mild edema   Neuro: No focal neurologic deficits  Skin: No rash   Psych: normal affect

## 2020-03-21 DIAGNOSIS — I26.99 OTHER PULMONARY EMBOLISM WITHOUT ACUTE COR PULMONALE: ICD-10-CM

## 2020-03-21 DIAGNOSIS — E78.00 PURE HYPERCHOLESTEROLEMIA, UNSPECIFIED: ICD-10-CM

## 2020-03-21 DIAGNOSIS — I10 ESSENTIAL (PRIMARY) HYPERTENSION: ICD-10-CM

## 2020-03-21 DIAGNOSIS — I82.409 ACUTE EMBOLISM AND THROMBOSIS OF UNSPECIFIED DEEP VEINS OF UNSPECIFIED LOWER EXTREMITY: ICD-10-CM

## 2020-03-21 PROBLEM — K37 UNSPECIFIED APPENDICITIS: Chronic | Status: ACTIVE | Noted: 2018-08-29

## 2020-03-21 LAB
ALBUMIN SERPL ELPH-MCNC: 3.5 G/DL — SIGNIFICANT CHANGE UP (ref 3.5–5.2)
ALP SERPL-CCNC: 52 U/L — SIGNIFICANT CHANGE UP (ref 30–115)
ALT FLD-CCNC: 33 U/L — SIGNIFICANT CHANGE UP (ref 0–41)
ANION GAP SERPL CALC-SCNC: 12 MMOL/L — SIGNIFICANT CHANGE UP (ref 7–14)
APTT BLD: 31 SEC — SIGNIFICANT CHANGE UP (ref 27–39.2)
AST SERPL-CCNC: 27 U/L — SIGNIFICANT CHANGE UP (ref 0–41)
BILIRUB SERPL-MCNC: 0.3 MG/DL — SIGNIFICANT CHANGE UP (ref 0.2–1.2)
BUN SERPL-MCNC: 21 MG/DL — HIGH (ref 10–20)
CALCIUM SERPL-MCNC: 8.8 MG/DL — SIGNIFICANT CHANGE UP (ref 8.5–10.1)
CHLORIDE SERPL-SCNC: 105 MMOL/L — SIGNIFICANT CHANGE UP (ref 98–110)
CO2 SERPL-SCNC: 24 MMOL/L — SIGNIFICANT CHANGE UP (ref 17–32)
CREAT SERPL-MCNC: 1.2 MG/DL — SIGNIFICANT CHANGE UP (ref 0.7–1.5)
GLUCOSE SERPL-MCNC: 128 MG/DL — HIGH (ref 70–99)
HCT VFR BLD CALC: 43.4 % — SIGNIFICANT CHANGE UP (ref 42–52)
HCV AB S/CO SERPL IA: 0.12 S/CO — SIGNIFICANT CHANGE UP (ref 0–0.99)
HCV AB SERPL-IMP: SIGNIFICANT CHANGE UP
HGB BLD-MCNC: 15.2 G/DL — SIGNIFICANT CHANGE UP (ref 14–18)
INR BLD: 1 RATIO — SIGNIFICANT CHANGE UP (ref 0.65–1.3)
MCHC RBC-ENTMCNC: 31 PG — SIGNIFICANT CHANGE UP (ref 27–31)
MCHC RBC-ENTMCNC: 35 G/DL — SIGNIFICANT CHANGE UP (ref 32–37)
MCV RBC AUTO: 88.4 FL — SIGNIFICANT CHANGE UP (ref 80–94)
NRBC # BLD: 0 /100 WBCS — SIGNIFICANT CHANGE UP (ref 0–0)
NT-PROBNP SERPL-SCNC: 97 PG/ML — SIGNIFICANT CHANGE UP (ref 0–300)
PLATELET # BLD AUTO: 181 K/UL — SIGNIFICANT CHANGE UP (ref 130–400)
POTASSIUM SERPL-MCNC: 3.8 MMOL/L — SIGNIFICANT CHANGE UP (ref 3.5–5)
POTASSIUM SERPL-SCNC: 3.8 MMOL/L — SIGNIFICANT CHANGE UP (ref 3.5–5)
PROT SERPL-MCNC: 6.4 G/DL — SIGNIFICANT CHANGE UP (ref 6–8)
PROTHROM AB SERPL-ACNC: 11.5 SEC — SIGNIFICANT CHANGE UP (ref 9.95–12.87)
RBC # BLD: 4.91 M/UL — SIGNIFICANT CHANGE UP (ref 4.7–6.1)
RBC # FLD: 12.2 % — SIGNIFICANT CHANGE UP (ref 11.5–14.5)
SODIUM SERPL-SCNC: 141 MMOL/L — SIGNIFICANT CHANGE UP (ref 135–146)
WBC # BLD: 10.16 K/UL — SIGNIFICANT CHANGE UP (ref 4.8–10.8)
WBC # FLD AUTO: 10.16 K/UL — SIGNIFICANT CHANGE UP (ref 4.8–10.8)

## 2020-03-21 PROCEDURE — 99223 1ST HOSP IP/OBS HIGH 75: CPT

## 2020-03-21 PROCEDURE — 71260 CT THORAX DX C+: CPT | Mod: 26

## 2020-03-21 RX ORDER — AMLODIPINE BESYLATE 2.5 MG/1
5 TABLET ORAL ONCE
Refills: 0 | Status: COMPLETED | OUTPATIENT
Start: 2020-03-21 | End: 2020-03-21

## 2020-03-21 RX ORDER — ENOXAPARIN SODIUM 100 MG/ML
100 INJECTION SUBCUTANEOUS
Refills: 0 | Status: DISCONTINUED | OUTPATIENT
Start: 2020-03-21 | End: 2020-03-22

## 2020-03-21 RX ORDER — ENOXAPARIN SODIUM 100 MG/ML
100 INJECTION SUBCUTANEOUS ONCE
Refills: 0 | Status: COMPLETED | OUTPATIENT
Start: 2020-03-21 | End: 2020-03-21

## 2020-03-21 RX ORDER — ACETAMINOPHEN 500 MG
650 TABLET ORAL EVERY 6 HOURS
Refills: 0 | Status: DISCONTINUED | OUTPATIENT
Start: 2020-03-21 | End: 2020-03-23

## 2020-03-21 RX ORDER — ATENOLOL 25 MG/1
25 TABLET ORAL DAILY
Refills: 0 | Status: DISCONTINUED | OUTPATIENT
Start: 2020-03-21 | End: 2020-03-23

## 2020-03-21 RX ADMIN — ENOXAPARIN SODIUM 100 MILLIGRAM(S): 100 INJECTION SUBCUTANEOUS at 17:54

## 2020-03-21 RX ADMIN — Medication 650 MILLIGRAM(S): at 13:05

## 2020-03-21 RX ADMIN — ENOXAPARIN SODIUM 100 MILLIGRAM(S): 100 INJECTION SUBCUTANEOUS at 01:40

## 2020-03-21 RX ADMIN — Medication 650 MILLIGRAM(S): at 13:35

## 2020-03-21 RX ADMIN — ATENOLOL 25 MILLIGRAM(S): 25 TABLET ORAL at 05:18

## 2020-03-21 RX ADMIN — AMLODIPINE BESYLATE 5 MILLIGRAM(S): 2.5 TABLET ORAL at 05:18

## 2020-03-21 RX ADMIN — ENOXAPARIN SODIUM 100 MILLIGRAM(S): 100 INJECTION SUBCUTANEOUS at 05:17

## 2020-03-21 NOTE — PATIENT PROFILE ADULT - NSPROEDAREADYLEARN_GEN_A_NUR
The patient was seen today for an ultrasound and NST  Please see ultrasound report (located under OB Procedures tab) for additional details 
none

## 2020-03-21 NOTE — H&P ADULT - PROBLEM SELECTOR PLAN 4
Continue current use of Atenolol but will probably need medication adjustments (In fact on arrival to floor, BP was high, will give a dose of Norvasc)

## 2020-03-21 NOTE — H&P ADULT - HISTORY OF PRESENT ILLNESS
63yo male with history of hypertension presents to the ER due to acute onset of right leg swelling 2 days ago with subsequent development of non productive cough, difficulty taking a deep breath and a "little chest heaviness" over same period of time. He denies any leg trauma and he also denies fevers or chills. Patient has not had any recent prolonged immobility, leg surgery or any history of blood clots. There is no history of blood clots in the family though his father and his brother both had strokes

## 2020-03-21 NOTE — CONSULT NOTE ADULT - SUBJECTIVE AND OBJECTIVE BOX
Patient is a 64y old  Male who presents with a chief complaint of Pulmonary Embolism (21 Mar 2020 02:21)      HPI:  65yo male with history of hypertension presents to the ER due to acute onset of right leg swelling 2 days ago with subsequent development of non productive cough, difficulty taking a deep breath and a "little chest heaviness" over same period of time. He denies any leg trauma and he also denies fevers or chills. Patient has not had any recent prolonged immobility, leg surgery or any history of blood clots. There is no history of blood clots in the family though his father and his brother both had strokes (21 Mar 2020 02:21)  also he denies any trauma   now he feel good complain of pain in his calf     PAST MEDICAL & SURGICAL HISTORY:  Appendicitis  High cholesterol  HTN (hypertension)  History of appendectomy: pt states he never had an appendectomy      FAMILY HISTORY:  No pertinent family history in first degree relatives    Family history: No family cardiovascular system   Occupation:  Alochol: Denied  Smoking: Denied  Drug Use: Denied  Marital Status:           Allergies    No Known Allergies    Intolerances        Home Medications:  atenolol 25 mg oral tablet: 1 tab(s) orally once a day (21 Mar 2020 02:21)      ROS: as in HPI; All other systems reviewed are negative        PHYSICAL EXAM:  Vital Signs Last 24 Hrs  T(C): 36.1 (21 Mar 2020 05:20), Max: 37 (20 Mar 2020 21:54)  T(F): 97 (21 Mar 2020 05:20), Max: 98.6 (20 Mar 2020 21:54)  HR: 62 (21 Mar 2020 05:20) (62 - 86)  BP: 157/76 (21 Mar 2020 05:20) (157/76 - 199/101)  BP(mean): --  RR: 18 (21 Mar 2020 05:20) (18 - 20)  SpO2: 96% (21 Mar 2020 02:23) (96% - 98%)      GENERAL: NAD, well-groomed, well-developed  HEAD:  Atraumatic, Normocephalic  EYES: EOMI, PERRLA, conjunctiva and sclera clear  ENMT: No tonsillar erythema, exudates, or enlargement; Moist mucous membranes, Good dentition, No lesions  NECK: Supple, No JVD, Normal thyroid  NERVOUS SYSTEM:  Alert & Oriented X3, Good concentration; Motor Strength 5/5 B/L upper and lower extremities; DTRs 2+ intact and symmetric  CHEST/LUNG: Clear to percussion bilaterally; No rales, rhonchi, wheezing, or rubs  HEART: Regular rate and rhythm; No murmurs, rubs, or gallops  ABDOMEN: Soft, Nontender, Nondistended; Bowel sounds present  EXTREMITIES:  2+ Peripheral Pulses, No clubbing, cyanosis, or edema  LYMPH: No lymphadenopathy noted  SKIN: No rashes or lesions    HOSPITAL MEDICATIONS:  MEDICATIONS  (STANDING):  ATENolol  Tablet 25 milliGRAM(s) Oral daily  enoxaparin Injectable 100 milliGRAM(s) SubCutaneous two times a day    MEDICATIONS  (PRN):  acetaminophen   Tablet .. 650 milliGRAM(s) Oral every 6 hours PRN Temp greater or equal to 38C (100.4F), Mild Pain (1 - 3)      LABS:                        15.2   10.16 )-----------( 181      ( 21 Mar 2020 06:54 )             43.4     03-21    141  |  105  |  21<H>  ----------------------------<  128<H>  3.8   |  24  |  1.2    Ca    8.8      21 Mar 2020 06:54    TPro  6.4  /  Alb  3.5  /  TBili  0.3  /  DBili  x   /  AST  27  /  ALT  33  /  AlkPhos  52  03-21    PT/INR - ( 21 Mar 2020 00:00 )   PT: 11.50 sec;   INR: 1.00 ratio         PTT - ( 21 Mar 2020 00:00 )  PTT:31.0 sec              RADIOLOGY:  < from: CT Chest w/ IV Cont (03.21.20 @ 01:13) >  Acute pulmonary emboli within right main pulmonary artery and throughout bilateral lobar and distal pulmonary arterial branches. No evidence of right heart strain.    < end of copied text >  [ ] Reviewed and interpreted by me      < from: [facility] Duplex Lower Ext Vein Scan, Bilat (03.20.20 @ 23:25) >  Deep venous thrombosis of the right popliteal and peroneal veins.  Superficial thrombophlebitis of right lesser saphenous vein.    No evidence of DVT or superficial thrombus phlebitis left lower extremity.    < end of copied text >    Point of Care Ultrasound Findings;    PFT:

## 2020-03-21 NOTE — H&P ADULT - NSHPLABSRESULTS_GEN_ALL_CORE
< from: CT Chest w/ IV Cont (03.21.20 @ 01:13) >    EXAM:  CT CHEST IC          PROCEDURE DATE:  03/21/2020      IMPRESSION:  Acute pulmonary emboli within right main pulmonary artery and throughout bilateral lobar and distal pulmonary arterial branches. No evidence of right heart strain.    Spoke with Dr. THEODORE on 3/21/2020 1:07 AM with readback.    DERREK MAHAJAN M.D., ATTENDING RADIOLOGIST  This document has been electronically signed. Mar 21 2020  1:14AM      < end of copied text >                          15.9   10.72 )-----------( 199      ( 20 Mar 2020 22:27 )             46.0     03-20    140  |  104  |  23<H>  ----------------------------<  153<H>  4.2   |  26  |  1.5    Ca    9.6      20 Mar 2020 22:27    TPro  7.0  /  Alb  3.8  /  TBili  0.4  /  DBili  x   /  AST  27  /  ALT  35  /  AlkPhos  61  03-20            PT/INR - ( 21 Mar 2020 00:00 )   PT: 11.50 sec;   INR: 1.00 ratio         PTT - ( 21 Mar 2020 00:00 )  PTT:31.0 sec  Lactate Trend    CARDIAC MARKERS ( 20 Mar 2020 22:27 )  x     / <0.01 ng/mL / x     / x     / x          CAPILLARY BLOOD GLUCOSE

## 2020-03-21 NOTE — CONSULT NOTE ADULT - ASSESSMENT
Impression:  Acute DVT / PE         Plan:  From pulmonary can switch to Eliquis or XArelto   most likely will need life long   outpatient hematology follow up for hypercoagulable work up   check pox on RA rest and exertion if pox < 89% arrange for home oxygen   need outpatient follow up in 4-6 weeks

## 2020-03-22 ENCOUNTER — TRANSCRIPTION ENCOUNTER (OUTPATIENT)
Age: 65
End: 2020-03-22

## 2020-03-22 PROCEDURE — 99233 SBSQ HOSP IP/OBS HIGH 50: CPT

## 2020-03-22 RX ORDER — RIVAROXABAN 15 MG-20MG
1 KIT ORAL
Qty: 42 | Refills: 0
Start: 2020-03-22 | End: 2020-04-11

## 2020-03-22 RX ORDER — RIVAROXABAN 15 MG-20MG
15 KIT ORAL
Refills: 0 | Status: DISCONTINUED | OUTPATIENT
Start: 2020-03-22 | End: 2020-03-22

## 2020-03-22 RX ORDER — RIVAROXABAN 15 MG-20MG
15 KIT ORAL
Refills: 0 | Status: DISCONTINUED | OUTPATIENT
Start: 2020-03-22 | End: 2020-03-23

## 2020-03-22 RX ORDER — RIVAROXABAN 15 MG-20MG
1 KIT ORAL
Qty: 30 | Refills: 0
Start: 2020-03-22 | End: 2020-04-20

## 2020-03-22 RX ADMIN — Medication 650 MILLIGRAM(S): at 18:28

## 2020-03-22 RX ADMIN — Medication 650 MILLIGRAM(S): at 19:00

## 2020-03-22 RX ADMIN — Medication 650 MILLIGRAM(S): at 12:29

## 2020-03-22 RX ADMIN — RIVAROXABAN 15 MILLIGRAM(S): KIT at 18:26

## 2020-03-22 RX ADMIN — ATENOLOL 25 MILLIGRAM(S): 25 TABLET ORAL at 05:35

## 2020-03-22 RX ADMIN — RIVAROXABAN 15 MILLIGRAM(S): KIT at 12:30

## 2020-03-22 RX ADMIN — Medication 650 MILLIGRAM(S): at 05:46

## 2020-03-22 RX ADMIN — Medication 650 MILLIGRAM(S): at 13:01

## 2020-03-22 RX ADMIN — Medication 650 MILLIGRAM(S): at 06:16

## 2020-03-22 RX ADMIN — ENOXAPARIN SODIUM 100 MILLIGRAM(S): 100 INJECTION SUBCUTANEOUS at 05:34

## 2020-03-22 NOTE — DISCHARGE NOTE PROVIDER - NSDCMRMEDTOKEN_GEN_ALL_CORE_FT
atenolol 25 mg oral tablet: 1 tab(s) orally once a day  rivaroxaban 15 mg oral tablet: 1 tab(s) orally 2 times a day (with meals) for 21 days and then 20mg once daily in the evening thereafter   rivaroxaban 20 mg oral tablet: 1 tab(s) orally once a day with evening meal (after completetion of twice daily 15mg dose of xarelto)

## 2020-03-22 NOTE — DISCHARGE NOTE PROVIDER - NSDCCPCAREPLAN_GEN_ALL_CORE_FT
PRINCIPAL DISCHARGE DIAGNOSIS  Diagnosis: Pulmonary embolism  Assessment and Plan of Treatment: continue with oral anticoagulation as prescribed and follow up with your PMD for further management.  You need to follow up with hematologist      SECONDARY DISCHARGE DIAGNOSES  Diagnosis: DVT (deep venous thrombosis)  Assessment and Plan of Treatment: continue with oral anticoagulation

## 2020-03-22 NOTE — DISCHARGE NOTE PROVIDER - HOSPITAL COURSE
65yo male with history of hypertension presents to the ER due to acute onset of right leg swelling 2 days ago with subsequent development of non productive cough, difficulty taking a deep breath and a "little chest heaviness" over same period of time. He denies any leg trauma and he also denies fevers or chills. Patient has not had any recent prolonged immobility, leg surgery or any history of blood clots. There is no history of blood clots in the family though his father and his brother both had strokes         Patient admitted to medical floor with Acute Pulmonary Embolism and Acute RLE DVT, given lovenox initially, seen by Pulmonary team, switched to oral     anticoagulant xarelto. Echo reviewed with no Right heart Strain        CT Chest w/ IV Cont (03.21.20 @ 01:13)             IMPRESSION:    Acute pulmonary emboli within right main pulmonary artery and throughout bilateral lobar and distal pulmonary arterial branches. No evidence of right heart strain.            VA Duplex Lower Ext Vein Scan, Bilat (03.20.20 @ 23:25)         Impression:    Deep venous thrombosis of the right popliteal and peroneal veins.    Superficial thrombophlebitis of right lesser saphenous vein.        No evidence of DVT or superficial thrombus phlebitis left lower extremity.        -pt seen and examined on the day of discharge and is doing well, stable for home d/c today        Spent over 35 mins d/c planning 65yo male with history of hypertension presents to the ER due to acute onset of right leg swelling 2 days ago with subsequent development of non productive cough, difficulty taking a deep breath and a "little chest heaviness" over same period of time. He denies any leg trauma and he also denies fevers or chills. Patient has not had any recent prolonged immobility, leg surgery or any history of blood clots. There is no history of blood clots in the family though his father and his brother both had strokes         Patient admitted to medical floor with Acute Pulmonary Embolism and Acute RLE DVT, given lovenox initially, seen by Pulmonary team, switched to oral     anticoagulant xarelto. Echo reviewed with no Right heart Strain (prelim, follow up official report)        CT Chest w/ IV Cont (03.21.20 @ 01:13)             IMPRESSION:    Acute pulmonary emboli within right main pulmonary artery and throughout bilateral lobar and distal pulmonary arterial branches. No evidence of right heart strain.            VA Duplex Lower Ext Vein Scan, Bilat (03.20.20 @ 23:25)         Impression:    Deep venous thrombosis of the right popliteal and peroneal veins.    Superficial thrombophlebitis of right lesser saphenous vein.        No evidence of DVT or superficial thrombus phlebitis left lower extremity.        -pt seen and examined on the day of discharge and is doing well, stable for home d/c today        Spent over 35 mins d/c planning

## 2020-03-22 NOTE — PROGRESS NOTE ADULT - SUBJECTIVE AND OBJECTIVE BOX
CATHI HERNANDEZLIUS  64y  Male      Patient is a 64y old  Male who presents with a chief complaint of Pulmonary Embolism (21 Mar 2020 13:03)      INTERVAL HPI/OVERNIGHT EVENTS:    pt seen and examined at bedside this morning   -appears comfortable; aware of the CT and LE duplex findings   -switch to oral anticoagulant xarelto; script sent to his pharmacy; home care nurse to find out the co-payment   -oob to chair and nurse to check RA pulse on ambulation     REVIEW OF SYSTEMS:  -dyspnea improved  -no chest pain       -Vital Signs Last 24 Hrs  T(C): 36.3 (21 Mar 2020 21:15), Max: 37.1 (21 Mar 2020 14:53)  T(F): 97.3 (21 Mar 2020 21:15), Max: 98.8 (21 Mar 2020 14:53)  HR: 58 (21 Mar 2020 21:15) (55 - 58)  BP: 146/94 (21 Mar 2020 21:15) (146/94 - 154/90)  RR: 18 (21 Mar 2020 21:15) (16 - 18)    PHYSICAL EXAM:  GENERAL: NAD, well-groomed, well-developed; speaking in full sentences, on RA > 95% pulse ox   HEAD:  Atraumatic, Normocephalic  EYES: EOMI, PERRLA, conjunctiva and sclera clear  NERVOUS SYSTEM:  Alert & Oriented X 3  CHEST/LUNG: Clear to percussion bilaterally; No rales, rhonchi, wheezing, or rubs  CV/HEART: Regular rate and rhythm; No murmurs, rubs, or gallops  GI/ABDOMEN: Soft, Nontender, Nondistended; Bowel sounds present  EXTREMITIES:  2+ Peripheral Pulses, No clubbing, cyanosis, or edema  SKIN: No rashes or lesions    LAB:                        15.2   10.16 )-----------( 181      ( 21 Mar 2020 06:54 )             43.4     03-21    141  |  105  |  21<H>  ----------------------------<  128<H>  3.8   |  24  |  1.2    Ca    8.8      21 Mar 2020 06:54    TPro  6.4  /  Alb  3.5  /  TBili  0.3  /  DBili  x   /  AST  27  /  ALT  33  /  AlkPhos  52  03-21    CARDIAC MARKERS ( 20 Mar 2020 22:27 )  x     / <0.01 ng/mL / x     / x     / x        Daily     Daily   CAPILLARY BLOOD GLUCOSE    LIVER FUNCTIONS - ( 21 Mar 2020 06:54 )  Alb: 3.5 g/dL / Pro: 6.4 g/dL / ALK PHOS: 52 U/L / ALT: 33 U/L / AST: 27 U/L / GGT: x           RADIOLOGY:    Imaging Personally Reviewed:  [ Y ] YES  [ ] NO    HEALTH ISSUES - PROBLEM Dx:  Pulmonary thromboembolism  HTN (hypertension): HTN (hypertension)  High cholesterol: High cholesterol  DVT (deep venous thrombosis): DVT (deep venous thrombosis)  Pulmonary embolism: Pulmonary embolism    MEDS:  acetaminophen   Tablet .. 650 milliGRAM(s) Oral every 6 hours PRN  ATENolol  Tablet 25 milliGRAM(s) Oral daily  rivaroxaban 15 milliGRAM(s) Oral two times a day with meals

## 2020-03-22 NOTE — DISCHARGE NOTE PROVIDER - CARE PROVIDER_API CALL
Benjamin Purcell)  Internal Medicine; Pulmonary Disease  [address], [address]  [place] [address]  Phone: [phone]  Fax: [phone]  Follow Up Time:     Pancho Young)  Medicine  [address] [place] [address]  Phone: [phone]  Fax: [phone]  Follow Up Time:

## 2020-03-22 NOTE — PROGRESS NOTE ADULT - ASSESSMENT
65yo male with history of hypertension presents to the ER due to acute onset of right leg swelling 2 days ago with subsequent development of non productive cough, difficulty taking a deep breath and a "little chest heaviness" over same period of time. He denies any leg trauma and he also denies fevers or chills. Patient has not had any recent prolonged immobility, leg surgery or any history of blood clots. There is no history of blood clots in the family though his father and his brother both had strokes     1) Acute Pulmonary Embolism   -no recent travels, long flights  -need to be on anticoagulant; received lovenox this morning; switch to oral xarelto (script sent to his pharmacy)  -ECHO + RA pulse on ambulation   -Pulmonary consult appreciated  -needs outpatient hematology/hypercoagulable work up     2) Acute DVT RLE  -continue with anticoagulation     3) HTN  -on atenolol    4) Morbidly obese  -BMI > 36  -needs weight loss and diet modification     oob to chair   dvt and gi ppx       # Progress Note Handoff  PENDING as follows  consults: Pulmonary appreciated   Test: ECHO  Family discussion: discussed with patient who comprehends his medical care and agreeable for d/c in 24 hrs and continuation of AC  Disposition: home in 24 hrs     Attending Physician Dr. Beti Dai # [id number]

## 2020-03-23 ENCOUNTER — TRANSCRIPTION ENCOUNTER (OUTPATIENT)
Age: 65
End: 2020-03-23

## 2020-03-23 VITALS
DIASTOLIC BLOOD PRESSURE: 95 MMHG | SYSTOLIC BLOOD PRESSURE: 169 MMHG | HEART RATE: 64 BPM | OXYGEN SATURATION: 96 % | TEMPERATURE: 96 F | RESPIRATION RATE: 16 BRPM

## 2020-03-23 PROCEDURE — 99239 HOSP IP/OBS DSCHRG MGMT >30: CPT

## 2020-03-23 RX ADMIN — RIVAROXABAN 15 MILLIGRAM(S): KIT at 17:23

## 2020-03-23 RX ADMIN — Medication 650 MILLIGRAM(S): at 09:25

## 2020-03-23 RX ADMIN — ATENOLOL 25 MILLIGRAM(S): 25 TABLET ORAL at 05:59

## 2020-03-23 RX ADMIN — Medication 650 MILLIGRAM(S): at 10:14

## 2020-03-23 RX ADMIN — RIVAROXABAN 15 MILLIGRAM(S): KIT at 10:12

## 2020-03-23 NOTE — PROGRESS NOTE ADULT - ASSESSMENT
65yo male with history of hypertension presents to the ER due to acute onset of right leg swelling 2 days ago with subsequent development of non productive cough, difficulty taking a deep breath and a "little chest heaviness" over same period of time. He denies any leg trauma and he also denies fevers or chills. Patient has not had any recent prolonged immobility, leg surgery or any history of blood clots. There is no history of blood clots in the family though his father and his brother both had strokes     1) Acute Pulmonary Embolism   -no recent travels, long flights  -need to be on anticoagulant; received lovenox this morning; switched to oral xarelto (script sent to his pharmacy)  -ECHO with no right heart strain   -Pulmonary consult appreciated  -needs outpatient hematology/hypercoagulable work up     2) Acute DVT RLE  -continue with anticoagulation     3) HTN  -on atenolol    4) Morbidly obese  -BMI > 36  -needs weight loss and diet modification     oob to chair   dvt and gi ppx       # Progress Note Handoff  PENDING as follows  consults: Pulmonary appreciated   Test: ECHO reviewed   Family discussion: discussed with patient who comprehends his medical care and agreeable for d/c today  Disposition: home today     Attending Physician Dr. Beti Dai # [id number]

## 2020-03-23 NOTE — PROGRESS NOTE ADULT - SUBJECTIVE AND OBJECTIVE BOX
DAVIDSUZY  64y  Male      Patient is a 64y old  Male who presents with a chief complaint of Pulmonary Embolism (21 Mar 2020 13:03)      INTERVAL HPI/OVERNIGHT EVENTS:    pt seen and examined at bedside this morning   -appears comfortable; aware of the CT and LE duplex findings   -switched to oral anticoagulant xarelto; script sent to his pharmacy; filled out the paperwork for prior auth  -pt has no complaints and wants to be discharged today   -prelim echo with no right heart strain     REVIEW OF SYSTEMS:  -dyspnea resolved   -no chest pain     Vital Signs Last 24 Hrs  T(C): 36.2 (23 Mar 2020 05:00), Max: 36.7 (22 Mar 2020 14:09)  T(F): 97.2 (23 Mar 2020 05:00), Max: 98.1 (22 Mar 2020 14:09)  HR: 58 (23 Mar 2020 05:00) (58 - 65)  BP: 152/86 (23 Mar 2020 05:00) (127/78 - 158/86)  RR: 18 (23 Mar 2020 05:00) (16 - 18)  SpO2: 95% (23 Mar 2020 09:28) (95% - 97%)    PHYSICAL EXAM:  GENERAL: NAD, well-groomed, well-developed; speaking in full sentences, on RA > 95% pulse ox   HEAD:  Atraumatic, Normocephalic  EYES: EOMI, PERRLA, conjunctiva and sclera clear  NERVOUS SYSTEM:  Alert & Oriented X 3  CHEST/LUNG: Clear to percussion bilaterally; No rales, rhonchi, wheezing, or rubs  CV/HEART: Regular rate and rhythm; No murmurs, rubs, or gallops  GI/ABDOMEN: Soft, Nontender, Nondistended; Bowel sounds present  EXTREMITIES:  2+ Peripheral Pulses, No clubbing, cyanosis, or edema  SKIN: No rashes or lesions    LAB:                        15.2   10.16 )-----------( 181      ( 21 Mar 2020 06:54 )             43.4     03-21    141  |  105  |  21<H>  ----------------------------<  128<H>  3.8   |  24  |  1.2    Ca    8.8      21 Mar 2020 06:54    TPro  6.4  /  Alb  3.5  /  TBili  0.3  /  DBili  x   /  AST  27  /  ALT  33  /  AlkPhos  52  03-21    CARDIAC MARKERS ( 20 Mar 2020 22:27 )  x     / <0.01 ng/mL / x     / x     / x        Daily     Daily   CAPILLARY BLOOD GLUCOSE    LIVER FUNCTIONS - ( 21 Mar 2020 06:54 )  Alb: 3.5 g/dL / Pro: 6.4 g/dL / ALK PHOS: 52 U/L / ALT: 33 U/L / AST: 27 U/L / GGT: x           RADIOLOGY:    Imaging Personally Reviewed:  [ Y ] YES  [ ] NO    HEALTH ISSUES - PROBLEM Dx:  Pulmonary thromboembolism  HTN (hypertension): HTN (hypertension)  High cholesterol: High cholesterol  DVT (deep venous thrombosis): DVT (deep venous thrombosis)  Pulmonary embolism: Pulmonary embolism    MEDICATIONS  (STANDING):  ATENolol  Tablet 25 milliGRAM(s) Oral daily  rivaroxaban 15 milliGRAM(s) Oral two times a day with meals    MEDICATIONS  (PRN):  acetaminophen   Tablet .. 650 milliGRAM(s) Oral every 6 hours PRN Temp greater or equal to 38C (100.4F), Mild Pain (1 - 3)

## 2020-03-23 NOTE — DISCHARGE NOTE NURSING/CASE MANAGEMENT/SOCIAL WORK - PATIENT PORTAL LINK FT
You can access the FollowMyHealth Patient Portal offered by [facility] by registering at the following website: [email or web address]. By joining [employer]’s FollowMyHealth portal, you will also be able to view your health information using other applications (apps) compatible with our system.

## 2020-03-25 DIAGNOSIS — I82.451 ACUTE EMBOLISM AND THROMBOSIS OF RIGHT PERONEAL VEIN: ICD-10-CM

## 2020-03-25 DIAGNOSIS — I80.01 PHLEBITIS AND THROMBOPHLEBITIS OF SUPERFICIAL VESSELS OF RIGHT LOWER EXTREMITY: ICD-10-CM

## 2020-03-25 DIAGNOSIS — I26.99 OTHER PULMONARY EMBOLISM WITHOUT ACUTE COR PULMONALE: ICD-10-CM

## 2020-03-25 DIAGNOSIS — E78.5 HYPERLIPIDEMIA, UNSPECIFIED: ICD-10-CM

## 2020-03-25 DIAGNOSIS — E66.01 MORBID (SEVERE) OBESITY DUE TO EXCESS CALORIES: ICD-10-CM

## 2020-03-25 DIAGNOSIS — I10 ESSENTIAL (PRIMARY) HYPERTENSION: ICD-10-CM

## 2020-03-25 DIAGNOSIS — I82.431 ACUTE EMBOLISM AND THROMBOSIS OF RIGHT POPLITEAL VEIN: ICD-10-CM

## 2020-06-22 ENCOUNTER — OUTPATIENT (OUTPATIENT)
Dept: OUTPATIENT SERVICES | Facility: HOSPITAL | Age: 65
LOS: 1 days | Discharge: HOME | End: 2020-06-22

## 2020-06-22 DIAGNOSIS — Z98.890 OTHER SPECIFIED POSTPROCEDURAL STATES: Chronic | ICD-10-CM

## 2020-06-23 DIAGNOSIS — G47.33 OBSTRUCTIVE SLEEP APNEA (ADULT) (PEDIATRIC): ICD-10-CM

## 2020-10-09 ENCOUNTER — EMERGENCY (EMERGENCY)
Facility: HOSPITAL | Age: 65
LOS: 0 days | Discharge: HOME | End: 2020-10-09
Attending: EMERGENCY MEDICINE | Admitting: EMERGENCY MEDICINE
Payer: COMMERCIAL

## 2020-10-09 VITALS
HEART RATE: 79 BPM | SYSTOLIC BLOOD PRESSURE: 159 MMHG | RESPIRATION RATE: 20 BRPM | OXYGEN SATURATION: 98 % | DIASTOLIC BLOOD PRESSURE: 98 MMHG

## 2020-10-09 VITALS
DIASTOLIC BLOOD PRESSURE: 101 MMHG | SYSTOLIC BLOOD PRESSURE: 170 MMHG | HEART RATE: 97 BPM | RESPIRATION RATE: 19 BRPM | OXYGEN SATURATION: 98 % | TEMPERATURE: 98 F | HEIGHT: 66 IN

## 2020-10-09 DIAGNOSIS — Z98.890 OTHER SPECIFIED POSTPROCEDURAL STATES: Chronic | ICD-10-CM

## 2020-10-09 DIAGNOSIS — I10 ESSENTIAL (PRIMARY) HYPERTENSION: ICD-10-CM

## 2020-10-09 DIAGNOSIS — E51.9 THIAMINE DEFICIENCY, UNSPECIFIED: ICD-10-CM

## 2020-10-09 DIAGNOSIS — Z79.899 OTHER LONG TERM (CURRENT) DRUG THERAPY: ICD-10-CM

## 2020-10-09 DIAGNOSIS — R60.0 LOCALIZED EDEMA: ICD-10-CM

## 2020-10-09 DIAGNOSIS — E78.5 HYPERLIPIDEMIA, UNSPECIFIED: ICD-10-CM

## 2020-10-09 DIAGNOSIS — E07.89 OTHER SPECIFIED DISORDERS OF THYROID: ICD-10-CM

## 2020-10-09 DIAGNOSIS — R42 DIZZINESS AND GIDDINESS: ICD-10-CM

## 2020-10-09 LAB
ALBUMIN SERPL ELPH-MCNC: 4.6 G/DL — SIGNIFICANT CHANGE UP (ref 3.5–5.2)
ALP SERPL-CCNC: 51 U/L — SIGNIFICANT CHANGE UP (ref 30–115)
ALT FLD-CCNC: 32 U/L — SIGNIFICANT CHANGE UP (ref 0–41)
ANION GAP SERPL CALC-SCNC: 10 MMOL/L — SIGNIFICANT CHANGE UP (ref 7–14)
APTT BLD: 32.3 SEC — SIGNIFICANT CHANGE UP (ref 27–39.2)
AST SERPL-CCNC: 34 U/L — SIGNIFICANT CHANGE UP (ref 0–41)
BASOPHILS # BLD AUTO: 0.08 K/UL — SIGNIFICANT CHANGE UP (ref 0–0.2)
BASOPHILS NFR BLD AUTO: 0.8 % — SIGNIFICANT CHANGE UP (ref 0–1)
BILIRUB SERPL-MCNC: 0.6 MG/DL — SIGNIFICANT CHANGE UP (ref 0.2–1.2)
BUN SERPL-MCNC: 15 MG/DL — SIGNIFICANT CHANGE UP (ref 10–20)
CALCIUM SERPL-MCNC: 9.7 MG/DL — SIGNIFICANT CHANGE UP (ref 8.5–10.1)
CHLORIDE SERPL-SCNC: 104 MMOL/L — SIGNIFICANT CHANGE UP (ref 98–110)
CO2 SERPL-SCNC: 28 MMOL/L — SIGNIFICANT CHANGE UP (ref 17–32)
CREAT SERPL-MCNC: 1 MG/DL — SIGNIFICANT CHANGE UP (ref 0.7–1.5)
EOSINOPHIL # BLD AUTO: 0.11 K/UL — SIGNIFICANT CHANGE UP (ref 0–0.7)
EOSINOPHIL NFR BLD AUTO: 1.1 % — SIGNIFICANT CHANGE UP (ref 0–8)
GLUCOSE SERPL-MCNC: 98 MG/DL — SIGNIFICANT CHANGE UP (ref 70–99)
HCT VFR BLD CALC: 50.4 % — SIGNIFICANT CHANGE UP (ref 42–52)
HGB BLD-MCNC: 17.4 G/DL — SIGNIFICANT CHANGE UP (ref 14–18)
IMM GRANULOCYTES NFR BLD AUTO: 0.3 % — SIGNIFICANT CHANGE UP (ref 0.1–0.3)
INR BLD: 1.06 RATIO — SIGNIFICANT CHANGE UP (ref 0.65–1.3)
LACTATE SERPL-SCNC: 0.9 MMOL/L — SIGNIFICANT CHANGE UP (ref 0.7–2)
LYMPHOCYTES # BLD AUTO: 2.23 K/UL — SIGNIFICANT CHANGE UP (ref 1.2–3.4)
LYMPHOCYTES # BLD AUTO: 21.3 % — SIGNIFICANT CHANGE UP (ref 20.5–51.1)
MCHC RBC-ENTMCNC: 30.9 PG — SIGNIFICANT CHANGE UP (ref 27–31)
MCHC RBC-ENTMCNC: 34.5 G/DL — SIGNIFICANT CHANGE UP (ref 32–37)
MCV RBC AUTO: 89.4 FL — SIGNIFICANT CHANGE UP (ref 80–94)
MONOCYTES # BLD AUTO: 0.71 K/UL — HIGH (ref 0.1–0.6)
MONOCYTES NFR BLD AUTO: 6.8 % — SIGNIFICANT CHANGE UP (ref 1.7–9.3)
NEUTROPHILS # BLD AUTO: 7.29 K/UL — HIGH (ref 1.4–6.5)
NEUTROPHILS NFR BLD AUTO: 69.7 % — SIGNIFICANT CHANGE UP (ref 42.2–75.2)
NRBC # BLD: 0 /100 WBCS — SIGNIFICANT CHANGE UP (ref 0–0)
PLATELET # BLD AUTO: 227 K/UL — SIGNIFICANT CHANGE UP (ref 130–400)
POTASSIUM SERPL-MCNC: 4.3 MMOL/L — SIGNIFICANT CHANGE UP (ref 3.5–5)
POTASSIUM SERPL-SCNC: 4.3 MMOL/L — SIGNIFICANT CHANGE UP (ref 3.5–5)
PROT SERPL-MCNC: 7.5 G/DL — SIGNIFICANT CHANGE UP (ref 6–8)
PROTHROM AB SERPL-ACNC: 12.2 SEC — SIGNIFICANT CHANGE UP (ref 9.95–12.87)
RBC # BLD: 5.64 M/UL — SIGNIFICANT CHANGE UP (ref 4.7–6.1)
RBC # FLD: 12.4 % — SIGNIFICANT CHANGE UP (ref 11.5–14.5)
SODIUM SERPL-SCNC: 142 MMOL/L — SIGNIFICANT CHANGE UP (ref 135–146)
TROPONIN T SERPL-MCNC: <0.01 NG/ML — SIGNIFICANT CHANGE UP
WBC # BLD: 10.45 K/UL — SIGNIFICANT CHANGE UP (ref 4.8–10.8)
WBC # FLD AUTO: 10.45 K/UL — SIGNIFICANT CHANGE UP (ref 4.8–10.8)

## 2020-10-09 PROCEDURE — 99285 EMERGENCY DEPT VISIT HI MDM: CPT

## 2020-10-09 PROCEDURE — 70450 CT HEAD/BRAIN W/O DYE: CPT | Mod: 26

## 2020-10-09 PROCEDURE — 93970 EXTREMITY STUDY: CPT | Mod: 26

## 2020-10-09 PROCEDURE — 71045 X-RAY EXAM CHEST 1 VIEW: CPT | Mod: 26

## 2020-10-09 RX ORDER — ATENOLOL 25 MG/1
1 TABLET ORAL
Qty: 0 | Refills: 0 | DISCHARGE

## 2020-10-09 NOTE — ED PROVIDER NOTE - CARE PROVIDER_API CALL
Rina Munoz)  Internal Medicine  [address] [place] [address]  Phone: [phone]  Fax: [phone]  Follow Up Time: 4-6 Days    Michael Patel  Vascular Surgery  [address] [place] [id number]  Phone: [phone]  Fax: [phone]  Follow Up Time: 1-3 Days

## 2020-10-09 NOTE — ED PROVIDER NOTE - PATIENT PORTAL LINK FT
You can access the FollowMyHealth Patient Portal offered by [facility] by registering at the following website: [email or web address]. By joining [employer]’s FollowMyHealth portal, you will also be able to view your health information using other applications (apps) compatible with our system.

## 2020-10-09 NOTE — ED PROVIDER NOTE - CLINICAL SUMMARY MEDICAL DECISION MAKING FREE TEXT BOX
Results reviewed and d/w patient.  BP improved without any ED intervention.  prelim Duplex with chronic clot in rt popliteal, femoral, left femoral.  questionable chronicity of left popliteal.  Attempted to contact vascular to arrange prompt follow up without success.  Pt given strict return precautions.  will follow up with PMD for repeat BP and re-eval.

## 2020-10-09 NOTE — ED ADULT NURSE NOTE - CHIEF COMPLAINT QUOTE
pt states he went [place] [facility] [place] and he got his BP done and it was high. Pt states he feels light headed. Pt currently takes Lisinopril.

## 2020-10-09 NOTE — ED PROVIDER NOTE - NS ED ROS FT
Eyes:  No visual changes, eye pain or discharge.  ENMT:  No hearing changes, pain, discharge or infections. No neck pain or stiffness.  Cardiac:  +mild chest tightness, no SOB or edema.  Respiratory:  No cough or respiratory distress. No hemoptysis.  GI:  No nausea, vomiting, diarrhea or abdominal pain.  :  No dysuria, frequency or burning.  MS:  +b/l lower extremities mild pain in lower legs w/ movement  Neuro:  +headache, +lightheadedness.  No LOC.  Skin:  No skin rash.   Endocrine: No history of thyroid disease or diabetes.

## 2020-10-09 NOTE — ED PROVIDER NOTE - ATTENDING CONTRIBUTION TO CARE
63 yo M h/o HTN, DVT/PE on AC [place] [facility] [place] evaluation of elevated BP.  Pt states he was seen in [facility] fore sore throat and told BP was elevated, sent in for eval.  Pt reports feeling lightheaded at times and dull headache x 3 days.  He was tested for covid and it was negative.  no CP,  also c/o LE selling.  On exam pt in NAD AAO x 3, speech is clear and fluent, neck supple, Lungs cta b/l, + b/l LE swelling R>L, non tender

## 2020-10-09 NOTE — ED PROVIDER NOTE - PHYSICAL EXAMINATION
CONSTITUTIONAL: Well-developed; well-nourished; in no acute distress.   SKIN: warm, dry  HEAD: Normocephalic; atraumatic.  EYES: PERRL, EOMI, no conjunctival erythema  ENT: No nasal discharge; airway clear.  NECK: Supple; non tender.  CARD: S1, S2 normal; no murmurs, gallops, or rubs. Regular rate and rhythm.   RESP: No wheezes, rales or rhonchi.  ABD: soft ntnd  EXT: Normal ROM.  +mild swelling to R LE  LYMPH: No acute cervical adenopathy.  NEURO: Alert, oriented, grossly unremarkable  PSYCH: Cooperative, appropriate.

## 2020-10-09 NOTE — ED ADULT TRIAGE NOTE - CHIEF COMPLAINT QUOTE
pt states he went [place] [facility] [place] and he got his BP done and it was high. Pt states he feels light headed. Pt currently takes Lisinopril.

## 2020-10-09 NOTE — ED PROVIDER NOTE - NSFOLLOWUPINSTRUCTIONS_ED_ALL_ED_FT
Hypertension    Hypertension, commonly called high blood pressure, is when the force of blood pumping through the arteries is too strong. The arteries are the blood vessels that carry blood from the heart throughout the body. Hypertension forces the heart to work harder to pump blood and may cause arteries to become narrow or stiff. Having untreated or uncontrolled hypertension can cause heart attacks, strokes, kidney disease, and other problems.    A blood pressure reading consists of a higher number over a lower number. Ideally, your blood pressure should be below 120/80. The first ("top") number is called the systolic pressure. It is a measure of the pressure in your arteries as your heart beats. The second ("bottom") number is called the diastolic pressure. It is a measure of the pressure in your arteries as the heart relaxes.    What are the causes?  The cause of this condition is not known.    What increases the risk?  Some risk factors for high blood pressure are under your control. Others are not.    Factors you can change     Smoking.  Having type 2 diabetes mellitus, high cholesterol, or both.  Not getting enough exercise or physical activity.  Being overweight.  Having too much fat, sugar, calories, or salt (sodium) in your diet.  Drinking too much alcohol.  Factors that are difficult or impossible to change     Having chronic kidney disease.  Having a family history of high blood pressure.  Age. Risk increases with age.  Race. You may be at higher risk if you are -American.  Gender. Men are at higher risk than women before age 45. After age 65, women are at higher risk than men.  Having obstructive sleep apnea.  Stress.  What are the signs or symptoms?  Extremely high blood pressure (hypertensive crisis) may cause:    Headache.  Anxiety.  Shortness of breath.  Nosebleed.  Nausea and vomiting.  Severe chest pain.  Jerky movements you cannot control (seizures).    How is this diagnosed?  This condition is diagnosed by measuring your blood pressure while you are seated, with your arm resting on a surface. The cuff of the blood pressure monitor will be placed directly against the skin of your upper arm at the level of your heart. It should be measured at least twice using the same arm. Certain conditions can cause a difference in blood pressure between your right and left arms.    Certain factors can cause blood pressure readings to be lower or higher than normal (elevated) for a short period of time:    When your blood pressure is higher when you are in a health care provider's office than when you are at home, this is called white coat hypertension. Most people with this condition do not need medicines.  When your blood pressure is higher at home than when you are in a health care provider's office, this is called masked hypertension. Most people with this condition may need medicines to control blood pressure.    If you have a high blood pressure reading during one visit or you have normal blood pressure with other risk factors:    You may be asked to return on a different day to have your blood pressure checked again.  You may be asked to monitor your blood pressure at home for 1 week or longer.    If you are diagnosed with hypertension, you may have other blood or imaging tests to help your health care provider understand your overall risk for other conditions.    How is this treated?  This condition is treated by making healthy lifestyle changes, such as eating healthy foods, exercising more, and reducing your alcohol intake. Your health care provider may prescribe medicine if lifestyle changes are not enough to get your blood pressure under control, and if:    Your systolic blood pressure is above 130.  Your diastolic blood pressure is above 80.    Your personal target blood pressure may vary depending on your medical conditions, your age, and other factors.    Follow these instructions at home:  Eating and drinking     Eat a diet that is high in fiber and potassium, and low in sodium, added sugar, and fat. An example eating plan is called the DASH (Dietary Approaches to Stop Hypertension) diet. To eat this way:    Eat plenty of fresh fruits and vegetables. Try to fill half of your plate at each meal with fruits and vegetables.  Eat whole grains, such as whole wheat pasta, brown rice, or whole grain bread. Fill about one quarter of your plate with whole grains.  Eat or drink low-fat dairy products, such as skim milk or low-fat yogurt.  Avoid fatty cuts of meat, processed or cured meats, and poultry with skin. Fill about one quarter of your plate with lean proteins, such as fish, chicken without skin, beans, eggs, and tofu.  Avoid premade and processed foods. These tend to be higher in sodium, added sugar, and fat.    Reduce your daily sodium intake. Most people with hypertension should eat less than 1,500 mg of sodium a day.  ImageLimit alcohol intake to no more than 1 drink a day for nonpregnant women and 2 drinks a day for men. One drink equals 12 oz of beer, 5 oz of wine, or 1½ oz of hard liquor.  Lifestyle     Work with your health care provider to maintain a healthy body weight or to lose weight. Ask what an ideal weight is for you.  Get at least 30 minutes of exercise that causes your heart to beat faster (aerobic exercise) most days of the week. Activities may include walking, swimming, or biking.  Include exercise to strengthen your muscles (resistance exercise), such as pilates or lifting weights, as part of your weekly exercise routine. Try to do these types of exercises for 30 minutes at least 3 days a week.  Do not use any products that contain nicotine or tobacco, such as cigarettes and e-cigarettes. If you need help quitting, ask your health care provider.  Monitor your blood pressure at home as told by your health care provider.  Keep all follow-up visits as told by your health care provider. This is important.  Medicines     Take over-the-counter and prescription medicines only as told by your health care provider. Follow directions carefully. Blood pressure medicines must be taken as prescribed.  Do not skip doses of blood pressure medicine. Doing this puts you at risk for problems and can make the medicine less effective.  Ask your health care provider about side effects or reactions to medicines that you should watch for.  Contact a health care provider if:  You think you are having a reaction to a medicine you are taking.  You have headaches that keep coming back (recurring).  You feel dizzy.  You have swelling in your ankles.  You have trouble with your vision.  Get help right away if:  You develop a severe headache or confusion.  You have unusual weakness or numbness.  You feel faint.  You have severe pain in your chest or abdomen.  You vomit repeatedly.  You have trouble breathing.  Summary  Hypertension is when the force of blood pumping through your arteries is too strong. If this condition is not controlled, it may put you at risk for serious complications.  Your personal target blood pressure may vary depending on your medical conditions, your age, and other factors. For most people, a normal blood pressure is less than 120/80.  Hypertension is treated with lifestyle changes, medicines, or a combination of both. Lifestyle changes include weight loss, eating a healthy, low-sodium diet, exercising more, and limiting alcohol.  This information is not intended to replace advice given to you by your health care provider. Make sure you discuss any questions you have with your health care provider.

## 2020-10-09 NOTE — ED PROVIDER NOTE - PROVIDER TOKENS
PROVIDER:[TOKEN:[[id number]],FOLLOWUP:[4-6 Days]],PROVIDER:[TOKEN:[[id number]],FOLLOWUP:[1-3 Days]]

## 2020-10-09 NOTE — ED PROVIDER NOTE - OBJECTIVE STATEMENT
65 y/o M with pmh of htn, hld, DVT/PE (admitted here in March of 2020) presenting for elevated blood pressure, bilateral leg swelling. Regarding the BP: pt. went to [facility] earlier today for sore throat. Had vital signs checked at that time, BP was 185/123. Pt. was referred to ED for further evaluation. Upon triage BP was 170/101. Pt. also endorsing lightheadedness & mild headache x3 days. Denies any LOC. States that he takes lisinopril daily, took it this AM. Re: leg swelling: states that b/l leg swelling started 1 day ago. R leg seems more swollen than the L leg, notes that it seems firmer and redder. No pitting edema. +mild chest tightness and palpitations. No n/v, fever/chills.

## 2020-10-12 PROBLEM — I82.409 ACUTE EMBOLISM AND THROMBOSIS OF UNSPECIFIED DEEP VEINS OF UNSPECIFIED LOWER EXTREMITY: Chronic | Status: ACTIVE | Noted: 2020-10-09

## 2020-10-13 ENCOUNTER — EMERGENCY (EMERGENCY)
Facility: HOSPITAL | Age: 65
LOS: 0 days | Discharge: HOME | End: 2020-10-13
Attending: EMERGENCY MEDICINE | Admitting: EMERGENCY MEDICINE
Payer: COMMERCIAL

## 2020-10-13 VITALS
HEART RATE: 88 BPM | SYSTOLIC BLOOD PRESSURE: 164 MMHG | OXYGEN SATURATION: 95 % | RESPIRATION RATE: 18 BRPM | TEMPERATURE: 98 F | DIASTOLIC BLOOD PRESSURE: 82 MMHG

## 2020-10-13 VITALS
DIASTOLIC BLOOD PRESSURE: 90 MMHG | WEIGHT: 315 LBS | HEART RATE: 82 BPM | SYSTOLIC BLOOD PRESSURE: 176 MMHG | RESPIRATION RATE: 18 BRPM | TEMPERATURE: 98 F | OXYGEN SATURATION: 94 % | HEIGHT: 66 IN

## 2020-10-13 DIAGNOSIS — I10 ESSENTIAL (PRIMARY) HYPERTENSION: ICD-10-CM

## 2020-10-13 DIAGNOSIS — Z00.00 ENCOUNTER FOR GENERAL ADULT MEDICAL EXAMINATION WITHOUT ABNORMAL FINDINGS: ICD-10-CM

## 2020-10-13 DIAGNOSIS — I82.432 ACUTE EMBOLISM AND THROMBOSIS OF LEFT POPLITEAL VEIN: ICD-10-CM

## 2020-10-13 DIAGNOSIS — Z98.890 OTHER SPECIFIED POSTPROCEDURAL STATES: Chronic | ICD-10-CM

## 2020-10-13 PROCEDURE — 99283 EMERGENCY DEPT VISIT LOW MDM: CPT

## 2020-10-13 RX ORDER — LOSARTAN POTASSIUM 100 MG/1
1 TABLET, FILM COATED ORAL
Qty: 14 | Refills: 0
Start: 2020-10-13 | End: 2020-10-26

## 2020-10-13 RX ORDER — RIVAROXABAN 15 MG-20MG
1 KIT ORAL
Qty: 14 | Refills: 0
Start: 2020-10-13 | End: 2020-10-26

## 2020-10-13 NOTE — ED POST DISCHARGE NOTE - DETAILS
PATIENT TELE DOES NOT HAVE A VOICEMAIL AND HE DOES NOT ANSWER IT. MESSAGE LEFT WITH ERNESTO, HIS EX-WIFE TO HAVE PATIENT CALL ME, SHE WILL CONTACT HIM NOW AND GIVE HIM MY MESSAGE TO CALL ME ASAP. PATIENT CALLED BACK AND HE WILL RETURN TO ED ASAP TODAY FOR NEW DVT NOTED LEFT POPLITEAL VEIN.

## 2020-10-13 NOTE — ED PROVIDER NOTE - NSFOLLOWUPINSTRUCTIONS_ED_ALL_ED_FT
PLEASE TAKE YOUR XARELTO DAILY. PLEASE TAKE LOSARTAN DAILY AS PRESCRIBED.     Deep Vein Thrombosis    A deep vein thrombosis (DVT) is a blood clot (thrombus) that usually occurs in a deep, larger vein of the lower leg or the pelvis, or in an upper extremity such as the arm. These are dangerous and can lead to serious and even life-threatening complications if the clot travels to the lungs. Symptoms include swelling of the arm or leg, warmth and redness or the arm or leg, and pain. Treatment may include taking a blood thinning medication; make sure to take anything prescribed as instructed.    SEEK IMMEDIATE MEDICAL CARE IF YOU HAVE THE FOLLOWING SYMPTOMS: shortness of breath, chest pain, rapid or irregular heartbeat, lightheadedness/dizziness, coughing up blood, or any bleeding in your vomit, stool, or urine. These symptoms may represent a serious problem that is an emergency. Do not wait to see if the symptoms will go away. Get medical help right away. Call your local emergency services (911 in the [place] Do not drive yourself to the hospital.    Hypertension  Hypertension, commonly called high blood pressure, is when the force of blood pumping through the arteries is too strong. The arteries are the blood vessels that carry blood from the heart throughout the body. Hypertension forces the heart to work harder to pump blood and may cause arteries to become narrow or stiff. Having untreated or uncontrolled hypertension can cause heart attacks, strokes, kidney disease, and other problems.    A blood pressure reading consists of a higher number over a lower number. Ideally, your blood pressure should be below 120/80. The first ("top") number is called the systolic pressure. It is a measure of the pressure in your arteries as your heart beats. The second ("bottom") number is called the diastolic pressure. It is a measure of the pressure in your arteries as the heart relaxes.    What are the causes?  The cause of this condition is not known.    What increases the risk?  Some risk factors for high blood pressure are under your control. Others are not.    Factors you can change     Smoking.  Having type 2 diabetes mellitus, high cholesterol, or both.  Not getting enough exercise or physical activity.  Being overweight.  Having too much fat, sugar, calories, or salt (sodium) in your diet.  Drinking too much alcohol.  Factors that are difficult or impossible to change     Having chronic kidney disease.  Having a family history of high blood pressure.  Age. Risk increases with age.  Race. You may be at higher risk if you are -American.  Gender. Men are at higher risk than women before age 45. After age 65, women are at higher risk than men.  Having obstructive sleep apnea.  Stress.  What are the signs or symptoms?  Extremely high blood pressure (hypertensive crisis) may cause:    Headache.  Anxiety.  Shortness of breath.  Nosebleed.  Nausea and vomiting.  Severe chest pain.  Jerky movements you cannot control (seizures).    How is this diagnosed?  This condition is diagnosed by measuring your blood pressure while you are seated, with your arm resting on a surface. The cuff of the blood pressure monitor will be placed directly against the skin of your upper arm at the level of your heart. It should be measured at least twice using the same arm. Certain conditions can cause a difference in blood pressure between your right and left arms.    Certain factors can cause blood pressure readings to be lower or higher than normal (elevated) for a short period of time:    When your blood pressure is higher when you are in a health care provider's office than when you are at home, this is called white coat hypertension. Most people with this condition do not need medicines.  When your blood pressure is higher at home than when you are in a health care provider's office, this is called masked hypertension. Most people with this condition may need medicines to control blood pressure.    If you have a high blood pressure reading during one visit or you have normal blood pressure with other risk factors:    You may be asked to return on a different day to have your blood pressure checked again.  You may be asked to monitor your blood pressure at home for 1 week or longer.    If you are diagnosed with hypertension, you may have other blood or imaging tests to help your health care provider understand your overall risk for other conditions.    How is this treated?  This condition is treated by making healthy lifestyle changes, such as eating healthy foods, exercising more, and reducing your alcohol intake. Your health care provider may prescribe medicine if lifestyle changes are not enough to get your blood pressure under control, and if:    Your systolic blood pressure is above 130.  Your diastolic blood pressure is above 80.    Your personal target blood pressure may vary depending on your medical conditions, your age, and other factors.    Follow these instructions at home:  Eating and drinking     Eat a diet that is high in fiber and potassium, and low in sodium, added sugar, and fat. An example eating plan is called the DASH (Dietary Approaches to Stop Hypertension) diet. To eat this way:    Eat plenty of fresh fruits and vegetables. Try to fill half of your plate at each meal with fruits and vegetables.  Eat whole grains, such as whole wheat pasta, brown rice, or whole grain bread. Fill about one quarter of your plate with whole grains.  Eat or drink low-fat dairy products, such as skim milk or low-fat yogurt.  Avoid fatty cuts of meat, processed or cured meats, and poultry with skin. Fill about one quarter of your plate with lean proteins, such as fish, chicken without skin, beans, eggs, and tofu.  Avoid premade and processed foods. These tend to be higher in sodium, added sugar, and fat.    Reduce your daily sodium intake. Most people with hypertension should eat less than 1,500 mg of sodium a day.  ImageLimit alcohol intake to no more than 1 drink a day for nonpregnant women and 2 drinks a day for men. One drink equals 12 oz of beer, 5 oz of wine, or 1½ oz of hard liquor.  Lifestyle     Work with your health care provider to maintain a healthy body weight or to lose weight. Ask what an ideal weight is for you.  Get at least 30 minutes of exercise that causes your heart to beat faster (aerobic exercise) most days of the week. Activities may include walking, swimming, or biking.  Include exercise to strengthen your muscles (resistance exercise), such as pilates or lifting weights, as part of your weekly exercise routine. Try to do these types of exercises for 30 minutes at least 3 days a week.  Do not use any products that contain nicotine or tobacco, such as cigarettes and e-cigarettes. If you need help quitting, ask your health care provider.  Monitor your blood pressure at home as told by your health care provider.  Keep all follow-up visits as told by your health care provider. This is important.  Medicines     Take over-the-counter and prescription medicines only as told by your health care provider. Follow directions carefully. Blood pressure medicines must be taken as prescribed.  Do not skip doses of blood pressure medicine. Doing this puts you at risk for problems and can make the medicine less effective.  Ask your health care provider about side effects or reactions to medicines that you should watch for.  Contact a health care provider if:  You think you are having a reaction to a medicine you are taking.  You have headaches that keep coming back (recurring).  You feel dizzy.  You have swelling in your ankles.  You have trouble with your vision.  Get help right away if:  You develop a severe headache or confusion.  You have unusual weakness or numbness.  You feel faint.  You have severe pain in your chest or abdomen.  You vomit repeatedly.  You have trouble breathing.  Summary  Hypertension is when the force of blood pumping through your arteries is too strong. If this condition is not controlled, it may put you at risk for serious complications.  Your personal target blood pressure may vary depending on your medical conditions, your age, and other factors. For most people, a normal blood pressure is less than 120/80.  Hypertension is treated with lifestyle changes, medicines, or a combination of both. Lifestyle changes include weight loss, eating a healthy, low-sodium diet, exercising more, and limiting alcohol.  This information is not intended to replace advice given to you by your health care provider. Make sure you discuss any questions you have with your health care provider.

## 2020-10-13 NOTE — ED ADULT NURSE NOTE - HIV OFFER
Discussed AREDS and recommended AREDS2 vitamins; recommend daily Amsler grid use; discussed no smoking or second-hand smoke. Previously Declined (within the last year)

## 2020-10-13 NOTE — ED PROVIDER NOTE - PATIENT PORTAL LINK FT
You can access the FollowMyHealth Patient Portal offered by [facility] by registering at the following website: [email or web address]. By joining [employer]’s FollowMyHealth portal, you will also be able to view your health information using other applications (apps) compatible with our system.

## 2020-10-13 NOTE — ED POST DISCHARGE NOTE - RESULT SUMMARY
VASCULAR STUDY- ACUTE LEFT POPLITEAL DVT. CASE DISCUSSED WITH DR. BILL, PATIENT TO BE BROUGHT BACK TO ED FOR RE-EVALUATION

## 2020-10-13 NOTE — ED PROVIDER NOTE - PHYSICAL EXAMINATION
CONSTITUTIONAL: Well-developed; well-nourished; in no acute distress.   SKIN: warm, dry  HEAD: Normocephalic; atraumatic.  EYES: normal sclera and conjunctiva   ENT: No nasal discharge; airway clear.  NECK: Supple; non tender.  CARD: S1, S2 normal; no murmurs, gallops, or rubs. Regular rate and rhythm.   RESP: No wheezes, rales or rhonchi.  ABD: soft ntnd  EXT: Normal ROM.  No clubbing, cyanosis or edema.  2+ pedal pulses b/l. B/l calf tenderness with minimal leg edema. Full ROM of BLE.   LYMPH: No acute cervical adenopathy.  NEURO: Alert, oriented, grossly unremarkable  PSYCH: Cooperative, appropriate.

## 2020-10-13 NOTE — ED PROVIDER NOTE - CLINICAL SUMMARY MEDICAL DECISION MAKING FREE TEXT BOX
63yo M called back after official duplex read positive for acute popliteal DVT on left. Pt has multiple chronic DVTs in BLE. No chest pain or SOB. Already on xarelto. Question of medication compliance with AC and anti-HTN. I strongly encouraged pt to take meds as prescribed. Has outpatient vascular follow up arranged. Return precautions given.

## 2020-10-13 NOTE — ED PROVIDER NOTE - OBJECTIVE STATEMENT
The patient is a 64 year old male with a history of HTN, HLD, DVT/PE on Xarelto called back to ED s/p positive DVT study on 10/9/20. Pt was seen earlier in the week for elevated BP and b/l leg swelling. Prelim duplex read stated there were multiple chronic clots through left popliteal with questionable chronicity. Pt was called back after official read said "acute deep vein thrombus in left popliteal vein, chronic deep vein thrombus in the left femoral vein. Chronic deep vein thrombus int he right popliteal vein." Pt states he ran out of his xarelto and has not taken it for 2 days. Pt also states he ran out of his losartan for HTN and requesting refills of medications. Pt has upcoming appointment with vascular (Dr. Patel) this month. Denies chest pain, sob, leg swelling.

## 2020-10-13 NOTE — ED PROVIDER NOTE - CARE PROVIDER_API CALL
Michael Patel  Vascular Surgery  [address] [place] [address]  Phone: [phone]  Fax: [phone]  Follow Up Time: Urgent    [place] [address]  Phone: [phone]  Fax: [phone]  Established Patient  Follow Up Time: Urgent

## 2020-10-13 NOTE — ED PROVIDER NOTE - PROVIDER TOKENS
PROVIDER:[TOKEN:[[id number]],FOLLOWUP:[Urgent]],PROVIDER:[TOKEN:[[id number]],FOLLOWUP:[Urgent],ESTABLISHEDPATIENT:[T]]

## 2020-10-13 NOTE — ED PROVIDER NOTE - NS ED ROS FT
Eyes:  No visual changes, eye pain or discharge.  ENMT:  No hearing changes, pain, discharge or infections. No neck pain or stiffness.  Cardiac:  No chest pain, SOB or edema. No chest pain with exertion.  Respiratory:  No cough or respiratory distress. No hemoptysis.   GI:  No nausea, vomiting, diarrhea or abdominal pain.  :  No dysuria, frequency or burning.  MS:  No myalgia, muscle weakness, joint pain or back pain.  Neuro:  No headache or weakness.  No LOC.  Skin:  No skin rash.   Endocrine: No history of thyroid disease or diabetes.

## 2020-10-13 NOTE — ED PROVIDER NOTE - ATTENDING CONTRIBUTION TO CARE
65yo M with DVT/PE on xarelto, HTN, HLD, called back to ED today for +ve DVT study. Pt was here 4 days ago for elevated BP and for bilateral leg swelling. At the time prelim duplex read with multiple chronic clots though left popliteal is questionable in chronicity. Official read was "Acute deep vein thrombus in the left popliteal vein, chronic deep vein thrombus in the left femoral vein. Chronic deep vein thrombus in the right popliteal vein." so patient was called back because of the read of acute DVT. Pt states has not been taking xarelto for 2 days because he ran out and there is a question of medication compliance. Also states is taking lisinopril for HTN but is not working and is instead taking losartan but also ran out. Is requesting refills of these medications. Has an appointment with vascular in 2 weeks. Denies fever, headache, lightheadedness, CP, SOB, nausea, vomiting, diarrhea, abd pain. States leg swelling has been improving though still occasionally has pain/ cramps in the left leg.    Vital signs reviewed  GENERAL: Patient nontoxic appearing, NAD  HEAD: NCAT  EYES: Anicteric  ENT: MMM  RESPIRATORY: Normal respiratory effort. CTA B/L. No wheezing, rales, rhonchi  CARDIOVASCULAR: Regular rate and rhythm  ABDOMEN: Soft. Nondistended. Nontender. No guarding or rebound.   MUSCULOSKELETAL/EXTREMITIES: Brisk cap refill. Palpable pedal pulses. B/L calf tenderness with minimal leg edema. Full ROM of BLE.   SKIN:  Warm and dry  NEURO: AAOx3. No gross FND.

## 2020-10-21 PROBLEM — Z00.00 ENCOUNTER FOR PREVENTIVE HEALTH EXAMINATION: Status: ACTIVE | Noted: 2020-10-21

## 2020-10-26 VITALS — HEIGHT: 67 IN | BODY MASS INDEX: 32.65 KG/M2 | WEIGHT: 208 LBS

## 2020-10-27 ENCOUNTER — APPOINTMENT (OUTPATIENT)
Dept: VASCULAR SURGERY | Facility: CLINIC | Age: 65
End: 2020-10-27
Payer: COMMERCIAL

## 2020-10-27 VITALS — TEMPERATURE: 98 F | WEIGHT: 203 LBS | HEIGHT: 67 IN | BODY MASS INDEX: 31.86 KG/M2

## 2020-10-27 VITALS — DIASTOLIC BLOOD PRESSURE: 93 MMHG | SYSTOLIC BLOOD PRESSURE: 149 MMHG

## 2020-10-27 DIAGNOSIS — I10 ESSENTIAL (PRIMARY) HYPERTENSION: ICD-10-CM

## 2020-10-27 DIAGNOSIS — M79.89 OTHER SPECIFIED SOFT TISSUE DISORDERS: ICD-10-CM

## 2020-10-27 DIAGNOSIS — I82.409 ACUTE EMBOLISM AND THROMBOSIS OF UNSPECIFIED DEEP VEINS OF UNSPECIFIED LOWER EXTREMITY: ICD-10-CM

## 2020-10-27 DIAGNOSIS — Z87.891 PERSONAL HISTORY OF NICOTINE DEPENDENCE: ICD-10-CM

## 2020-10-27 DIAGNOSIS — I26.99 OTHER PULMONARY EMBOLISM W/OUT ACUTE COR PULMONALE: ICD-10-CM

## 2020-10-27 PROCEDURE — 99072 ADDL SUPL MATRL&STAF TM PHE: CPT

## 2020-10-27 PROCEDURE — 99203 OFFICE O/P NEW LOW 30 MIN: CPT

## 2020-10-27 PROCEDURE — 93970 EXTREMITY STUDY: CPT

## 2020-10-27 RX ORDER — LOSARTAN POTASSIUM 100 MG/1
TABLET, FILM COATED ORAL
Refills: 0 | Status: ACTIVE | COMMUNITY

## 2020-10-27 RX ORDER — RIVAROXABAN 2.5 MG/1
TABLET, FILM COATED ORAL
Refills: 0 | Status: ACTIVE | COMMUNITY

## 2020-10-27 NOTE — DATA REVIEWED
[FreeTextEntry1] : I performed a venous duplex which was medically necessary to evaluate for DVT. It showed chronic changes in the right popliteal vein and chronic changes in the left femoral and popliteal veins.\par

## 2020-10-27 NOTE — ASSESSMENT
[FreeTextEntry1] : 64 y/o gentleman with unprovoked bilateral lower extremity DVT and pulmonary embolism, treated with 7 months of Xarelto.\par \par On venous duplex today, chronic DVT noted in the lower extremities bilaterally. I have advised him to stop Xarelto and advised Hematology consultation for hypercoagulable work up. I am also prescribing him compression stockings (30-40 mm Hg compression) to be worn daily during the day to prevent post-thrombotic syndrome.\par \par He can follow up as needed.

## 2020-12-02 ENCOUNTER — EMERGENCY (EMERGENCY)
Facility: HOSPITAL | Age: 65
LOS: 0 days | Discharge: HOME | End: 2020-12-02
Attending: EMERGENCY MEDICINE | Admitting: EMERGENCY MEDICINE
Payer: COMMERCIAL

## 2020-12-02 VITALS
TEMPERATURE: 97 F | SYSTOLIC BLOOD PRESSURE: 128 MMHG | HEART RATE: 76 BPM | OXYGEN SATURATION: 98 % | DIASTOLIC BLOOD PRESSURE: 62 MMHG

## 2020-12-02 VITALS — WEIGHT: 149.91 LBS | HEIGHT: 66 IN

## 2020-12-02 DIAGNOSIS — Z79.899 OTHER LONG TERM (CURRENT) DRUG THERAPY: ICD-10-CM

## 2020-12-02 DIAGNOSIS — I10 ESSENTIAL (PRIMARY) HYPERTENSION: ICD-10-CM

## 2020-12-02 DIAGNOSIS — E78.5 HYPERLIPIDEMIA, UNSPECIFIED: ICD-10-CM

## 2020-12-02 DIAGNOSIS — R07.0 PAIN IN THROAT: ICD-10-CM

## 2020-12-02 DIAGNOSIS — Z98.890 OTHER SPECIFIED POSTPROCEDURAL STATES: Chronic | ICD-10-CM

## 2020-12-02 LAB
ALBUMIN SERPL ELPH-MCNC: 3.9 G/DL — SIGNIFICANT CHANGE UP (ref 3.5–5.2)
ALP SERPL-CCNC: 55 U/L — SIGNIFICANT CHANGE UP (ref 30–115)
ALT FLD-CCNC: 27 U/L — SIGNIFICANT CHANGE UP (ref 0–41)
ANION GAP SERPL CALC-SCNC: 10 MMOL/L — SIGNIFICANT CHANGE UP (ref 7–14)
AST SERPL-CCNC: 25 U/L — SIGNIFICANT CHANGE UP (ref 0–41)
BASOPHILS # BLD AUTO: 0.11 K/UL — SIGNIFICANT CHANGE UP (ref 0–0.2)
BASOPHILS NFR BLD AUTO: 1.3 % — HIGH (ref 0–1)
BILIRUB SERPL-MCNC: 0.3 MG/DL — SIGNIFICANT CHANGE UP (ref 0.2–1.2)
BUN SERPL-MCNC: 17 MG/DL — SIGNIFICANT CHANGE UP (ref 10–20)
CALCIUM SERPL-MCNC: 9.3 MG/DL — SIGNIFICANT CHANGE UP (ref 8.5–10.1)
CHLORIDE SERPL-SCNC: 106 MMOL/L — SIGNIFICANT CHANGE UP (ref 98–110)
CO2 SERPL-SCNC: 24 MMOL/L — SIGNIFICANT CHANGE UP (ref 17–32)
CREAT SERPL-MCNC: 1.1 MG/DL — SIGNIFICANT CHANGE UP (ref 0.7–1.5)
EOSINOPHIL # BLD AUTO: 0.21 K/UL — SIGNIFICANT CHANGE UP (ref 0–0.7)
EOSINOPHIL NFR BLD AUTO: 2.5 % — SIGNIFICANT CHANGE UP (ref 0–8)
GLUCOSE SERPL-MCNC: 169 MG/DL — HIGH (ref 70–99)
HCT VFR BLD CALC: 46.1 % — SIGNIFICANT CHANGE UP (ref 42–52)
HGB BLD-MCNC: 16.3 G/DL — SIGNIFICANT CHANGE UP (ref 14–18)
IMM GRANULOCYTES NFR BLD AUTO: 0.4 % — HIGH (ref 0.1–0.3)
LYMPHOCYTES # BLD AUTO: 1.85 K/UL — SIGNIFICANT CHANGE UP (ref 1.2–3.4)
LYMPHOCYTES # BLD AUTO: 21.9 % — SIGNIFICANT CHANGE UP (ref 20.5–51.1)
MCHC RBC-ENTMCNC: 31.3 PG — HIGH (ref 27–31)
MCHC RBC-ENTMCNC: 35.4 G/DL — SIGNIFICANT CHANGE UP (ref 32–37)
MCV RBC AUTO: 88.7 FL — SIGNIFICANT CHANGE UP (ref 80–94)
MONOCYTES # BLD AUTO: 0.62 K/UL — HIGH (ref 0.1–0.6)
MONOCYTES NFR BLD AUTO: 7.3 % — SIGNIFICANT CHANGE UP (ref 1.7–9.3)
NEUTROPHILS # BLD AUTO: 5.63 K/UL — SIGNIFICANT CHANGE UP (ref 1.4–6.5)
NEUTROPHILS NFR BLD AUTO: 66.6 % — SIGNIFICANT CHANGE UP (ref 42.2–75.2)
NRBC # BLD: 0 /100 WBCS — SIGNIFICANT CHANGE UP (ref 0–0)
PLATELET # BLD AUTO: 202 K/UL — SIGNIFICANT CHANGE UP (ref 130–400)
POTASSIUM SERPL-MCNC: 4 MMOL/L — SIGNIFICANT CHANGE UP (ref 3.5–5)
POTASSIUM SERPL-SCNC: 4 MMOL/L — SIGNIFICANT CHANGE UP (ref 3.5–5)
PROT SERPL-MCNC: 6.3 G/DL — SIGNIFICANT CHANGE UP (ref 6–8)
RBC # BLD: 5.2 M/UL — SIGNIFICANT CHANGE UP (ref 4.7–6.1)
RBC # FLD: 11.9 % — SIGNIFICANT CHANGE UP (ref 11.5–14.5)
SODIUM SERPL-SCNC: 140 MMOL/L — SIGNIFICANT CHANGE UP (ref 135–146)
TROPONIN T SERPL-MCNC: <0.01 NG/ML — SIGNIFICANT CHANGE UP
WBC # BLD: 8.45 K/UL — SIGNIFICANT CHANGE UP (ref 4.8–10.8)
WBC # FLD AUTO: 8.45 K/UL — SIGNIFICANT CHANGE UP (ref 4.8–10.8)

## 2020-12-02 PROCEDURE — 71046 X-RAY EXAM CHEST 2 VIEWS: CPT | Mod: 26

## 2020-12-02 PROCEDURE — 99285 EMERGENCY DEPT VISIT HI MDM: CPT

## 2020-12-02 RX ORDER — LOSARTAN POTASSIUM 100 MG/1
1 TABLET, FILM COATED ORAL
Qty: 14 | Refills: 0
Start: 2020-12-02 | End: 2020-12-15

## 2020-12-02 RX ORDER — FAMOTIDINE 10 MG/ML
20 INJECTION INTRAVENOUS ONCE
Refills: 0 | Status: COMPLETED | OUTPATIENT
Start: 2020-12-02 | End: 2020-12-02

## 2020-12-02 RX ADMIN — Medication 125 MILLIGRAM(S): at 04:16

## 2020-12-02 RX ADMIN — FAMOTIDINE 20 MILLIGRAM(S): 10 INJECTION INTRAVENOUS at 04:15

## 2020-12-02 NOTE — ED PROVIDER NOTE - NSFOLLOWUPINSTRUCTIONS_ED_ALL_ED_FT
Heartburn  ImageHeartburn is a type of pain or discomfort that can happen in the throat or chest. It is often described as a burning pain. It may also cause a bad taste in the mouth. Heartburn may feel worse when you lie down or bend over. It may be caused by stomach contents that move back up (reflux) into the tube that connects the mouth with the stomach (esophagus).    Follow these instructions at home:  Take these actions to lessen your discomfort and to help avoid problems.    Diet     Follow a diet as told by your doctor. You may need to avoid foods and drinks such as:    Coffee and tea (with or without caffeine).  Drinks that contain alcohol.  Energy drinks and sports drinks.  Carbonated drinks or sodas.  Chocolate and cocoa.  Peppermint and mint flavorings.  Garlic and onions.  Horseradish.  Spicy and acidic foods, such as peppers, chili powder, morales powder, vinegar, hot sauces, and BBQ sauce.  Citrus fruit juices and citrus fruits, such as oranges, renea, and limes.  Tomato-based foods, such as red sauce, chili, salsa, and pizza with red sauce.  Fried and fatty foods, such as donuts, french fries, potato chips, and high-fat dressings.  High-fat meats, such as hot dogs, rib eye steak, sausage, ham, and hernandez.  High-fat dairy items, such as whole milk, butter, and cream cheese.    Eat small meals often. Avoid eating large meals.  Avoid drinking large amounts of liquid with your meals.  Avoid eating meals during the 2–3 hours before bedtime.  Avoid lying down right after you eat.  Do not exercise right after you eat.  General instructions     Pay attention to any changes in your symptoms.  Take over-the-counter and prescription medicines only as told by your doctor. Do not take aspirin, ibuprofen, or other NSAIDs unless your doctor says it is okay.  Do not use any tobacco products, including cigarettes, chewing tobacco, and e-cigarettes. If you need help quitting, ask your doctor.  Wear loose clothes. Do not wear anything tight around your waist.  Raise (elevate) the head of your bed about 6 inches (15 cm).  Try to lower your stress. If you need help doing this, ask your doctor.  If you are overweight, lose an amount of weight that is healthy for you. Ask your doctor about a safe weight loss goal.  Keep all follow-up visits as told by your doctor. This is important.  Contact a doctor if:  You have new symptoms.  You lose weight and you do not know why it is happening.  You have trouble swallowing, or it hurts to swallow.  You have wheezing or a cough that keeps happening.  Your symptoms do not get better with treatment.  You have heartburn often for more than two weeks.  Get help right away if:  You have pain in your arms, neck, jaw, teeth, or back.  You feel sweaty, dizzy, or light-headed.  You have chest pain or shortness of breath.  You throw up (vomit) and your throw up looks like blood or coffee grounds.  Your poop (stool) is bloody or black.  This information is not intended to replace advice given to you by your health care provider. Make sure you discuss any questions you have with your health care provider.

## 2020-12-02 NOTE — ED PROVIDER NOTE - CARE PROVIDER_API CALL
Guadalupe Monsalve)  Gastroenterology  [address] [place] [address]  Phone: [phone]  Fax: [phone]  Follow Up Time: 1-3 Days    carmela,   Phone: (   )    -  Fax: (   )    -  Follow Up Time: 1-3 Days

## 2020-12-02 NOTE — ED PROVIDER NOTE - PROVIDER TOKENS
PROVIDER:[TOKEN:[[id number]],FOLLOWUP:[1-3 Days]],FREE:[LAST:[lefko],PHONE:[(   )    -],FAX:[(   )    -],FOLLOWUP:[1-3 Days]]

## 2020-12-02 NOTE — ED PROVIDER NOTE - CLINICAL SUMMARY MEDICAL DECISION MAKING FREE TEXT BOX
65yM pmhx htn pw episode of chocking sensation in his throat  that woke him up from sleep -  felt throat tightness and burning.  lasted about 30 minutes -  now pt with residual FB sensation to throat.  no sob diaphoresis  chest pain , no skin rash. pt received benadryl by ems -  in ed pt well appearing pharynx no uvulitis  cvs rrr resp cta  2+ radial pulse bl equal. unlikely to be cardiac -  ekg no ischemia trop negative - cxr  no acute findings,pt feels much better -  Patient to be discharged from ED. Any available test results were discussed with and printed  for patient.  Verbal instructions given, including instructions to return to ED immediately for any new, worsening, or concerning symptoms. Limitations of ED work up discussed.  Patient reports understanding of above with capacity and insight. Written discharge instructions additionally given, including follow-up plan.

## 2020-12-02 NOTE — ED PROVIDER NOTE - NS ED ROS FT
Constitutional: (-) fever, (-) chills  Eyes: (-) visual changes  ENT: (-) nasal congestions, (+) throat pain  Cardiovascular: (-) chest pain, (-) syncope  Respiratory: (-) cough, (-) shortness of breath, (-) dyspnea,   Gastrointestinal: (-) vomiting, (-) diarrhea, (-)nausea,  Musculoskeletal: (-) neck pain, (-) back pain, (-) joint pain,  Integumentary: (-) rash, (-) edema, (-) bruises  Neurological: (-) headache, (-) loc, (-) dizziness, (-) tingling, (-)numbness,  Peripheral Vascular: (-) leg swelling  :  (-)dysuria,  (-) hematuria  Allergic/Immunologic: (-) pruritus

## 2020-12-02 NOTE — ED PROVIDER NOTE - PHYSICAL EXAMINATION
Physical Exam    Vital Signs: I have reviewed the initial vital signs.  Constitutional: well-nourished, appears stated age, no acute distress  Eyes: Conjunctiva pink, Sclera clear.  ENT: OP is clear without exudates, no lymphadenopathy.  Cardiovascular: S1 and S2, regular rate, regular rhythm, well-perfused extremities, radial pulses equal and 2+, pedal pulses 2+ and equal   Respiratory: unlabored respiratory effort, clear to auscultation bilaterally no wheezing, rales and rhonchi  Gastrointestinal: soft, non-tender abdomen, no pulsatile mass, normal bowl sounds  Musculoskeletal: supple neck, no lower extremity edema, no midline tenderness  Integumentary: warm, dry, no rash  Neurologic: awake, alert, nvi

## 2020-12-02 NOTE — ED ADULT NURSE NOTE - NSIMPLEMENTINTERV_GEN_ALL_ED
Implemented All Universal Safety Interventions:  [place] to call system. Call bell, personal items and telephone within reach. Instruct patient to call for assistance. Room bathroom lighting operational. Non-slip footwear when patient is off stretcher. Physically safe environment: no spills, clutter or unnecessary equipment. Stretcher in lowest position, wheels locked, appropriate side rails in place.

## 2020-12-02 NOTE — ED PROVIDER NOTE - PATIENT PORTAL LINK FT
You can access the FollowMyHealth Patient Portal offered by [facility] by registering at the following website: [email or web address]. By joining [employer]’s FollowMyHealth portal, you will also be able to view your health information using other applications (apps) compatible with our system.

## 2020-12-02 NOTE — ED PROVIDER NOTE - OBJECTIVE STATEMENT
65yM pmhx htn pw episode of chocking sensation in his throat  that woke him up from sleep -  felt throat tightness and burning.  lasted about 30 minutes -  now pt with residual FB sensation to throat.  no sob diaphoresis  chest pain , no skin rash. pt received benadryl by ems

## 2021-07-02 ENCOUNTER — EMERGENCY (EMERGENCY)
Facility: HOSPITAL | Age: 66
LOS: 0 days | Discharge: HOME | End: 2021-07-02
Attending: EMERGENCY MEDICINE | Admitting: EMERGENCY MEDICINE
Payer: COMMERCIAL

## 2021-07-02 VITALS
RESPIRATION RATE: 20 BRPM | HEART RATE: 73 BPM | TEMPERATURE: 97 F | DIASTOLIC BLOOD PRESSURE: 93 MMHG | WEIGHT: 205.03 LBS | SYSTOLIC BLOOD PRESSURE: 186 MMHG | OXYGEN SATURATION: 95 % | HEIGHT: 66 IN

## 2021-07-02 VITALS
RESPIRATION RATE: 18 BRPM | HEART RATE: 78 BPM | SYSTOLIC BLOOD PRESSURE: 175 MMHG | DIASTOLIC BLOOD PRESSURE: 89 MMHG | OXYGEN SATURATION: 97 %

## 2021-07-02 DIAGNOSIS — Z79.899 OTHER LONG TERM (CURRENT) DRUG THERAPY: ICD-10-CM

## 2021-07-02 DIAGNOSIS — M79.672 PAIN IN LEFT FOOT: ICD-10-CM

## 2021-07-02 DIAGNOSIS — S99.911A UNSPECIFIED INJURY OF RIGHT ANKLE, INITIAL ENCOUNTER: ICD-10-CM
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
## 2021-07-02 DIAGNOSIS — Z98.890 OTHER SPECIFIED POSTPROCEDURAL STATES: Chronic | ICD-10-CM
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
## 2021-07-02 DIAGNOSIS — Z79.01 LONG TERM (CURRENT) USE OF ANTICOAGULANTS: ICD-10-CM

## 2021-07-02 PROCEDURE — 73610 X-RAY EXAM OF ANKLE: CPT | Mod: 26,RT

## 2021-07-02 PROCEDURE — 29515 APPLICATION SHORT LEG SPLINT: CPT | Mod: RT

## 2021-07-02 PROCEDURE — 73630 X-RAY EXAM OF FOOT: CPT | Mod: 26,RT

## 2021-07-02 PROCEDURE — 99284 EMERGENCY DEPT VISIT MOD MDM: CPT | Mod: 25

## 2021-07-02 RX ORDER — KETOROLAC TROMETHAMINE 30 MG/ML
30 SYRINGE (ML) INJECTION ONCE
Refills: 0 | Status: DISCONTINUED | OUTPATIENT
Start: 2021-07-02 | End: 2021-07-02

## 2021-07-02 RX ADMIN — Medication 30 MILLIGRAM(S): at 08:30

## 2021-07-02 NOTE — ED PROVIDER NOTE - OBJECTIVE STATEMENT
Pt is a 64y/o male presents today for eval of left foot pain after walking in park 3 days ago. Pt denies trauma, fever,. chills, weakness, numbness

## 2021-07-02 NOTE — ED PROVIDER NOTE - ATTENDING CONTRIBUTION TO CARE
Pt here for 3d of R ankle pain - no traumatic injury but was walking a lot the day preceding.  No numbness or weakness.    Exam: R medial malleolus tenderenss, 2+ DP pulse, normals kin, normal sensation, no effusion, NAD  Plan: xr, splint, ortho f/u

## 2021-07-02 NOTE — ED PROVIDER NOTE - CARE PROVIDER_API CALL
Hayden Marcano (MD)  Orthopaedic Surgery  [address] [place] [address]  Phone: [phone]  Fax: [phone]  Follow Up Time: 7-10 Days

## 2021-07-02 NOTE — ED PROVIDER NOTE - PHYSICAL EXAMINATION
VITAL SIGNS: I have reviewed nursing notes and confirm.  CONSTITUTIONAL: Well-developed; well-nourished; in no acute distress.   SKIN: skin exam is warm and dry, no acute rash.    HEAD: Normocephalic; atraumatic.  EYES: conjunctiva and sclera clear.  ENT: No nasal discharge; airway clear.  EXT: mild TTP over dorsum of left foot Normal ROM.  No clubbing, cyanosis or edema.   NEURO: Alert, oriented, grossly unremarkable

## 2021-07-02 NOTE — ED PROVIDER NOTE - PATIENT PORTAL LINK FT
You can access the FollowMyHealth Patient Portal offered by [facility] by registering at the following website: [email or web address]. By joining [employer]’s FollowMyHealth portal, you will also be able to view your health information using other applications (apps) compatible with our system.

## 2021-07-02 NOTE — ED PROVIDER NOTE - NSFOLLOWUPINSTRUCTIONS_ED_ALL_ED_FT
See the orthopedist in 1 week for repeat XRs.    Fracture    A fracture is a break in one of your bones. This can occur from a variety of injuries, especially traumatic ones. Symptoms include pain, bruising, or swelling. Do not use the injured limb. If a fracture is in one of the bones below your waist, do not put weight on that limb unless instructed to do so by your healthcare provider. Crutches or a cane may have been provided. A splint or cast may have been applied by your health care provider. Make sure to keep it dry and follow up with an orthopedist as instructed.    SEEK IMMEDIATE MEDICAL CARE IF YOU HAVE ANY OF THE FOLLOWING SYMPTOMS: numbness, tingling, increasing pain, or weakness in any part of the injured limb.

## 2021-07-02 NOTE — ED PROVIDER NOTE - NS ED ROS FT
MS:  + foot pain No myalgia, muscle weakness, back pain.  Neuro:  No headache or weakness.  No LOC.  Skin:  No skin rash.   Endocrine: No history of thyroid disease or diabetes.  Except as documented in the HPI,  all other systems are negative.  '

## 2021-07-21 ENCOUNTER — INPATIENT (INPATIENT)
Facility: HOSPITAL | Age: 66
LOS: 0 days | Discharge: HOME | End: 2021-07-22
Attending: INTERNAL MEDICINE | Admitting: INTERNAL MEDICINE
Payer: COMMERCIAL

## 2021-07-21 VITALS
TEMPERATURE: 98 F | HEIGHT: 66 IN | RESPIRATION RATE: 18 BRPM | WEIGHT: 210.1 LBS | OXYGEN SATURATION: 93 % | HEART RATE: 73 BPM | DIASTOLIC BLOOD PRESSURE: 89 MMHG | SYSTOLIC BLOOD PRESSURE: 167 MMHG

## 2021-07-21 DIAGNOSIS — Z98.890 OTHER SPECIFIED POSTPROCEDURAL STATES: Chronic | ICD-10-CM

## 2021-07-21 LAB
ALBUMIN SERPL ELPH-MCNC: 3.6 G/DL — SIGNIFICANT CHANGE UP (ref 3.5–5.2)
ALP SERPL-CCNC: 66 U/L — SIGNIFICANT CHANGE UP (ref 30–115)
ALT FLD-CCNC: 28 U/L — SIGNIFICANT CHANGE UP (ref 0–41)
ANION GAP SERPL CALC-SCNC: 10 MMOL/L — SIGNIFICANT CHANGE UP (ref 7–14)
APTT BLD: 28.2 SEC — SIGNIFICANT CHANGE UP (ref 27–39.2)
AST SERPL-CCNC: 23 U/L — SIGNIFICANT CHANGE UP (ref 0–41)
BASOPHILS # BLD AUTO: 0.08 K/UL — SIGNIFICANT CHANGE UP (ref 0–0.2)
BASOPHILS NFR BLD AUTO: 0.8 % — SIGNIFICANT CHANGE UP (ref 0–1)
BILIRUB SERPL-MCNC: 0.3 MG/DL — SIGNIFICANT CHANGE UP (ref 0.2–1.2)
BUN SERPL-MCNC: 22 MG/DL — HIGH (ref 10–20)
CALCIUM SERPL-MCNC: 9.4 MG/DL — SIGNIFICANT CHANGE UP (ref 8.5–10.1)
CHLORIDE SERPL-SCNC: 106 MMOL/L — SIGNIFICANT CHANGE UP (ref 98–110)
CO2 SERPL-SCNC: 24 MMOL/L — SIGNIFICANT CHANGE UP (ref 17–32)
CREAT SERPL-MCNC: 1.1 MG/DL — SIGNIFICANT CHANGE UP (ref 0.7–1.5)
EOSINOPHIL # BLD AUTO: 0.17 K/UL — SIGNIFICANT CHANGE UP (ref 0–0.7)
EOSINOPHIL NFR BLD AUTO: 1.8 % — SIGNIFICANT CHANGE UP (ref 0–8)
GLUCOSE SERPL-MCNC: 108 MG/DL — HIGH (ref 70–99)
HCT VFR BLD CALC: 41.1 % — LOW (ref 42–52)
HGB BLD-MCNC: 14.6 G/DL — SIGNIFICANT CHANGE UP (ref 14–18)
IMM GRANULOCYTES NFR BLD AUTO: 0.6 % — HIGH (ref 0.1–0.3)
INR BLD: 1.04 RATIO — SIGNIFICANT CHANGE UP (ref 0.65–1.3)
LYMPHOCYTES # BLD AUTO: 2.52 K/UL — SIGNIFICANT CHANGE UP (ref 1.2–3.4)
LYMPHOCYTES # BLD AUTO: 26.2 % — SIGNIFICANT CHANGE UP (ref 20.5–51.1)
MCHC RBC-ENTMCNC: 31.9 PG — HIGH (ref 27–31)
MCHC RBC-ENTMCNC: 35.5 G/DL — SIGNIFICANT CHANGE UP (ref 32–37)
MCV RBC AUTO: 89.9 FL — SIGNIFICANT CHANGE UP (ref 80–94)
MONOCYTES # BLD AUTO: 0.78 K/UL — HIGH (ref 0.1–0.6)
MONOCYTES NFR BLD AUTO: 8.1 % — SIGNIFICANT CHANGE UP (ref 1.7–9.3)
NEUTROPHILS # BLD AUTO: 6.01 K/UL — SIGNIFICANT CHANGE UP (ref 1.4–6.5)
NEUTROPHILS NFR BLD AUTO: 62.5 % — SIGNIFICANT CHANGE UP (ref 42.2–75.2)
NRBC # BLD: 0 /100 WBCS — SIGNIFICANT CHANGE UP (ref 0–0)
PLATELET # BLD AUTO: 189 K/UL — SIGNIFICANT CHANGE UP (ref 130–400)
POTASSIUM SERPL-MCNC: 3.9 MMOL/L — SIGNIFICANT CHANGE UP (ref 3.5–5)
POTASSIUM SERPL-SCNC: 3.9 MMOL/L — SIGNIFICANT CHANGE UP (ref 3.5–5)
PROT SERPL-MCNC: 6.1 G/DL — SIGNIFICANT CHANGE UP (ref 6–8)
PROTHROM AB SERPL-ACNC: 12 SEC — SIGNIFICANT CHANGE UP (ref 9.95–12.87)
RBC # BLD: 4.57 M/UL — LOW (ref 4.7–6.1)
RBC # FLD: 12.8 % — SIGNIFICANT CHANGE UP (ref 11.5–14.5)
SODIUM SERPL-SCNC: 140 MMOL/L — SIGNIFICANT CHANGE UP (ref 135–146)
WBC # BLD: 9.62 K/UL — SIGNIFICANT CHANGE UP (ref 4.8–10.8)
WBC # FLD AUTO: 9.62 K/UL — SIGNIFICANT CHANGE UP (ref 4.8–10.8)

## 2021-07-21 PROCEDURE — 93010 ELECTROCARDIOGRAM REPORT: CPT

## 2021-07-21 PROCEDURE — 99284 EMERGENCY DEPT VISIT MOD MDM: CPT

## 2021-07-21 PROCEDURE — ZZZZZ: CPT

## 2021-07-21 PROCEDURE — 93970 EXTREMITY STUDY: CPT | Mod: 26

## 2021-07-21 NOTE — ED PROVIDER NOTE - ATTENDING CONTRIBUTION TO CARE
I personally evaluated the patient. I reviewed the Resident’s or Physician Assistant’s note (as assigned above), and agree with the findings and plan except as documented in my note.     65 male here for bilateral leg cramps right > left without injury. Concerned for DVT as he has past history of same with consequential PE.     ROS otherwise unremarkable    PE: male in no distress. CV: pulses intact. CHEST: normal work of breathing. ABD: nondistended. SKIN: normal. EXT: FROM. no leg swelling. right calf tenderness noted NEURO: AAO 3 no focal deficits.    Impression: calf pain    Plan:  imaging supportive care and reevaluation

## 2021-07-21 NOTE — ED PROVIDER NOTE - PHYSICAL EXAMINATION
--EXAM--  VITAL SIGNS: I have reviewed vs documented at present.  CONSTITUTIONAL: Well-developed; well-nourished; in no acute distress.     CARD: S1, S2, Regular rate and rhythm.   RESP: No wheezes, rales or rhonchi.    EXT: right leg mild swelling no tenderness to palpation

## 2021-07-21 NOTE — ED PROVIDER NOTE - OBJECTIVE STATEMENT
this is 64 yo male presents to ed for evaluation of right leg pain. patient denies any injury. patient followed up with ortho and was told he had plantar fascitis. patient states he is now having some pain in right calf and wants to make sure he does not have DVT

## 2021-07-21 NOTE — ED PROVIDER NOTE - CLINICAL SUMMARY MEDICAL DECISION MAKING FREE TEXT BOX
65 male here for leg pain. Had screening labs imaging medications and reevaluation, found to have acute and chronic DVT bilaterally, plan is for inpatient admission for continued management.

## 2021-07-21 NOTE — ED PROVIDER NOTE - NS ED ROS FT
Review of Systems:  	•	CONSTITUTIONAL - no fever, no diaphoresis, no chills  	•	SKIN - no rash    	•	RESPIRATORY - no shortness of breath, no cough  	•	CARDIAC - no chest pain, no palpitations    	•	MUSCULOSKELETAL - right leg pain

## 2021-07-22 ENCOUNTER — TRANSCRIPTION ENCOUNTER (OUTPATIENT)
Age: 66
End: 2021-07-22

## 2021-07-22 VITALS
DIASTOLIC BLOOD PRESSURE: 83 MMHG | TEMPERATURE: 97 F | RESPIRATION RATE: 16 BRPM | SYSTOLIC BLOOD PRESSURE: 172 MMHG | HEART RATE: 68 BPM

## 2021-07-22 DIAGNOSIS — I82.409 ACUTE EMBOLISM AND THROMBOSIS OF UNSPECIFIED DEEP VEINS OF UNSPECIFIED LOWER EXTREMITY: ICD-10-CM

## 2021-07-22 DIAGNOSIS — I10 ESSENTIAL (PRIMARY) HYPERTENSION: ICD-10-CM

## 2021-07-22 LAB
ANION GAP SERPL CALC-SCNC: 8 MMOL/L — SIGNIFICANT CHANGE UP (ref 7–14)
BUN SERPL-MCNC: 20 MG/DL — SIGNIFICANT CHANGE UP (ref 10–20)
CALCIUM SERPL-MCNC: 8.6 MG/DL — SIGNIFICANT CHANGE UP (ref 8.5–10.1)
CHLORIDE SERPL-SCNC: 108 MMOL/L — SIGNIFICANT CHANGE UP (ref 98–110)
CO2 SERPL-SCNC: 23 MMOL/L — SIGNIFICANT CHANGE UP (ref 17–32)
CREAT SERPL-MCNC: 0.9 MG/DL — SIGNIFICANT CHANGE UP (ref 0.7–1.5)
GLUCOSE SERPL-MCNC: 110 MG/DL — HIGH (ref 70–99)
HCT VFR BLD CALC: 41.1 % — LOW (ref 42–52)
HGB BLD-MCNC: 14.4 G/DL — SIGNIFICANT CHANGE UP (ref 14–18)
MCHC RBC-ENTMCNC: 31.6 PG — HIGH (ref 27–31)
MCHC RBC-ENTMCNC: 35 G/DL — SIGNIFICANT CHANGE UP (ref 32–37)
MCV RBC AUTO: 90.3 FL — SIGNIFICANT CHANGE UP (ref 80–94)
NRBC # BLD: 0 /100 WBCS — SIGNIFICANT CHANGE UP (ref 0–0)
PLATELET # BLD AUTO: 186 K/UL — SIGNIFICANT CHANGE UP (ref 130–400)
POTASSIUM SERPL-MCNC: 4 MMOL/L — SIGNIFICANT CHANGE UP (ref 3.5–5)
POTASSIUM SERPL-SCNC: 4 MMOL/L — SIGNIFICANT CHANGE UP (ref 3.5–5)
RBC # BLD: 4.55 M/UL — LOW (ref 4.7–6.1)
RBC # FLD: 13 % — SIGNIFICANT CHANGE UP (ref 11.5–14.5)
SARS-COV-2 RNA SPEC QL NAA+PROBE: SIGNIFICANT CHANGE UP
SODIUM SERPL-SCNC: 139 MMOL/L — SIGNIFICANT CHANGE UP (ref 135–146)
WBC # BLD: 9.13 K/UL — SIGNIFICANT CHANGE UP (ref 4.8–10.8)
WBC # FLD AUTO: 9.13 K/UL — SIGNIFICANT CHANGE UP (ref 4.8–10.8)

## 2021-07-22 PROCEDURE — 99239 HOSP IP/OBS DSCHRG MGMT >30: CPT

## 2021-07-22 PROCEDURE — 99223 1ST HOSP IP/OBS HIGH 75: CPT

## 2021-07-22 RX ORDER — ENOXAPARIN SODIUM 100 MG/ML
100 INJECTION SUBCUTANEOUS ONCE
Refills: 0 | Status: COMPLETED | OUTPATIENT
Start: 2021-07-22 | End: 2021-07-22

## 2021-07-22 RX ORDER — RIVAROXABAN 15 MG-20MG
1 KIT ORAL
Qty: 30 | Refills: 0
Start: 2021-07-22

## 2021-07-22 RX ORDER — RIVAROXABAN 15 MG-20MG
1 KIT ORAL
Qty: 42 | Refills: 0
Start: 2021-07-22

## 2021-07-22 RX ORDER — LOSARTAN POTASSIUM 100 MG/1
1 TABLET, FILM COATED ORAL
Qty: 30 | Refills: 0
Start: 2021-07-22 | End: 2021-08-20

## 2021-07-22 RX ORDER — ACETAMINOPHEN 500 MG
650 TABLET ORAL EVERY 6 HOURS
Refills: 0 | Status: DISCONTINUED | OUTPATIENT
Start: 2021-07-22 | End: 2021-07-22

## 2021-07-22 RX ORDER — CHLORHEXIDINE GLUCONATE 213 G/1000ML
1 SOLUTION TOPICAL DAILY
Refills: 0 | Status: DISCONTINUED | OUTPATIENT
Start: 2021-07-22 | End: 2021-07-22

## 2021-07-22 RX ORDER — ENOXAPARIN SODIUM 100 MG/ML
100 INJECTION SUBCUTANEOUS
Refills: 0 | Status: DISCONTINUED | OUTPATIENT
Start: 2021-07-22 | End: 2021-07-22

## 2021-07-22 RX ORDER — LOSARTAN POTASSIUM 100 MG/1
50 TABLET, FILM COATED ORAL DAILY
Refills: 0 | Status: DISCONTINUED | OUTPATIENT
Start: 2021-07-22 | End: 2021-07-22

## 2021-07-22 RX ORDER — LISINOPRIL 2.5 MG/1
0 TABLET ORAL
Qty: 0 | Refills: 0 | DISCHARGE

## 2021-07-22 RX ORDER — MORPHINE SULFATE 50 MG/1
2 CAPSULE, EXTENDED RELEASE ORAL EVERY 6 HOURS
Refills: 0 | Status: DISCONTINUED | OUTPATIENT
Start: 2021-07-22 | End: 2021-07-22

## 2021-07-22 RX ADMIN — ENOXAPARIN SODIUM 100 MILLIGRAM(S): 100 INJECTION SUBCUTANEOUS at 00:17

## 2021-07-22 RX ADMIN — MORPHINE SULFATE 2 MILLIGRAM(S): 50 CAPSULE, EXTENDED RELEASE ORAL at 03:28

## 2021-07-22 RX ADMIN — MORPHINE SULFATE 2 MILLIGRAM(S): 50 CAPSULE, EXTENDED RELEASE ORAL at 01:36

## 2021-07-22 RX ADMIN — Medication 650 MILLIGRAM(S): at 15:48

## 2021-07-22 RX ADMIN — LOSARTAN POTASSIUM 50 MILLIGRAM(S): 100 TABLET, FILM COATED ORAL at 05:16

## 2021-07-22 RX ADMIN — ENOXAPARIN SODIUM 100 MILLIGRAM(S): 100 INJECTION SUBCUTANEOUS at 05:16

## 2021-07-22 RX ADMIN — Medication 650 MILLIGRAM(S): at 14:33

## 2021-07-22 NOTE — H&P ADULT - NSHPLABSRESULTS_GEN_ALL_CORE
14.6   9.62  )-----------( 189      ( 21 Jul 2021 23:25 )             41.1       07-21    140  |  106  |  22<H>  ----------------------------<  108<H>  3.9   |  24  |  1.1    Ca    9.4      21 Jul 2021 23:25    TPro  6.1  /  Alb  3.6  /  TBili  0.3  /  DBili  x   /  AST  23  /  ALT  28  /  AlkPhos  66  07-21        < from: Xray Foot AP + Lateral + Oblique, Right (07.02.21 @ 09:16) >    INTERPRETATION:  Clinical History / Reason for exam: Pain.    3 views.    Findings/  impression: Talocalcaneal coalition. No acute fracture or dislocation.. Plantar calcaneal tiny spur/plantar fascial tiny calcification. Calcaneal Achilles tendon enthesophyte.    < end of copied text >              PT/INR - ( 21 Jul 2021 23:25 )   PT: 12.00 sec;   INR: 1.04 ratio         PTT - ( 21 Jul 2021 23:25 )  PTT:28.2 sec    Lactate Trend            CAPILLARY BLOOD GLUCOSE

## 2021-07-22 NOTE — ED ADULT NURSE NOTE - IS THE PATIENT ABLE TO BE SCREENED?
Visited with patient as no PCP listed in chart. States she has no PCP and would like assistance in getting an appointment. Provided with list and chose [id number]. Called there and spoke with Diana. Appointment made for Thursday July 27th @ 3:20. All information written down and given to patient. Understanding verbalized.
Yes
[id number] Comprehensive

## 2021-07-22 NOTE — DISCHARGE NOTE PROVIDER - PROVIDER TOKENS
PROVIDER:[TOKEN:[[id number]],FOLLOWUP:[1 week]],FREE:[LAST:[Dr. Rowell - vascular surgeon],PHONE:[(   )    -],FAX:[(   )    -],FOLLOWUP:[1 week]],FREE:[LAST:[Primary medical doctor],PHONE:[(   )    -],FAX:[(   )    -]]

## 2021-07-22 NOTE — DISCHARGE NOTE NURSING/CASE MANAGEMENT/SOCIAL WORK - PATIENT PORTAL LINK FT
You can access the FollowMyHealth Patient Portal offered by [facility] by registering at the following website: [email or web address]. By joining [facility]’s FollowMyHealth portal, you will also be able to view your health information using other applications (apps) compatible with our system.

## 2021-07-22 NOTE — H&P ADULT - HISTORY OF PRESENT ILLNESS
this is 64 yo male presents to ed for evaluation of right leg pain. patient denies any injury. patient followed up with ortho and was told he had plantar fascitis. patient states he is now having some pain in right calf and wants to make sure he does not have DVT . Patient denies taking any DVT prophylaxis .

## 2021-07-22 NOTE — DISCHARGE NOTE PROVIDER - NSDCCPCAREPLAN_GEN_ALL_CORE_FT
PRINCIPAL DISCHARGE DIAGNOSIS  Diagnosis: Lower extremity pain, right  Assessment and Plan of Treatment: you had a sonogram done which revealed old blood clots  please follow up with vascular, your pmd, hematology and orthopedics

## 2021-07-22 NOTE — DISCHARGE NOTE PROVIDER - CARE PROVIDER_API CALL
Bear Ahumada)  Hematology; Internal Medicine; Medical Oncology  [address] [place] [address]  Phone: [phone]  Fax: [phone]  Follow Up Time: 1 week    Dr. Rowell - vascular surgeon,   Phone: (   )    -  Fax: (   )    -  Follow Up Time: 1 week    Primary medical doctor,   Phone: (   )    -  Fax: (   )    -  Follow Up Time:

## 2021-07-22 NOTE — DISCHARGE NOTE PROVIDER - HOSPITAL COURSE
64 yo male presents to ed for evaluation of right leg pain. patient denies any injury. patient followed up with ortho and was told he had plantar fascitis. patient states he is now having some pain in right calf and wants to make sure he does not have DVT . Patient denies taking any DVT prophylaxis.    Patient seen and examined this morning and in the afternoon  Patient's LE doppler was officially read by vascular surgery and it does not demonstrate an acute DVT  I discussed this with the vascular surgeon twice to confirm - no acute clot.  Patient will not be discharged on anticoagulation    Vital Signs Last 24 Hrs  T(C): 36.2 (22 Jul 2021 13:39), Max: 36.8 (21 Jul 2021 22:05)  T(F): 97.1 (22 Jul 2021 13:39), Max: 98.3 (21 Jul 2021 22:05)  HR: 68 (22 Jul 2021 13:39) (64 - 78)  BP: 172/83 (22 Jul 2021 13:39) (141/67 - 172/83)  BP(mean): --  RR: 16 (22 Jul 2021 13:39) (16 - 18)  SpO2: 96% (22 Jul 2021 10:28) (93% - 100%)    PHYSICAL EXAM:  GENERAL: NAD, well-groomed, well-developed  HEAD:  Atraumatic, Normocephalic  EYES: EOMI, PERRLA, conjunctiva and sclera clear  NERVOUS SYSTEM:  Alert & Oriented X 4, Good concentration; Motor Strength 5/5 B/L upper and lower extremities; DTRs 2+ intact and symmetric  CHEST/LUNG: Clear to percussion bilaterally; No rales, rhonchi, wheezing, or rubs  HEART: Regular rate and rhythm; No murmurs, rubs, or gallops  ABDOMEN: Soft, Nontender, Nondistended; Bowel sounds present  EXTREMITIES:  2+ Peripheral Pulses, No clubbing, cyanosis.  Mild right calf tenderness.    SKIN: No rashes or lesions    Patient diagnosed with plantar fasciitis by his ortho prior to admission  He is advised to follow up with ortho, vascular (he has seen dr. Hebert in the past) and hematology for hypercoaguable work up    D/C today; d/c planning took over 45 min  d/c papers done by me  discussed d/c plan and all instructions in detail

## 2021-07-22 NOTE — H&P ADULT - ATTENDING COMMENTS
Patient seen at bedside independent form medical PA. Agree with assessment and plan outlined. Expect switch to oral coagulant soon, Has seen vascular surgery in past, may need to follow up with them (outpatient?) Also consider podiatry consult for foot findings on X-Ray

## 2021-07-22 NOTE — H&P ADULT - NSICDXPASTMEDICALHX_GEN_ALL_CORE_FT
PAST MEDICAL HISTORY:  Appendicitis     DVT (deep venous thrombosis)     High cholesterol     HTN (hypertension)     
no

## 2021-07-22 NOTE — DISCHARGE NOTE PROVIDER - CARE PROVIDERS DIRECT ADDRESSES
[email or web address],DirectAddress_Unknown,DirectAddress_Unknown

## 2021-07-28 DIAGNOSIS — I10 ESSENTIAL (PRIMARY) HYPERTENSION: ICD-10-CM

## 2021-07-28 DIAGNOSIS — M79.661 PAIN IN RIGHT LOWER LEG: ICD-10-CM

## 2021-07-28 DIAGNOSIS — M72.2 PLANTAR FASCIAL FIBROMATOSIS: ICD-10-CM

## 2021-07-28 DIAGNOSIS — I82.409 ACUTE EMBOLISM AND THROMBOSIS OF UNSPECIFIED DEEP VEINS OF UNSPECIFIED LOWER EXTREMITY: ICD-10-CM

## 2022-01-01 NOTE — ED PROVIDER NOTE - CARE PLAN
Principal Discharge DX:	Palpitations  Assessment and plan of treatment:	>3 weeks of palpitations and shortness of breath. Pt requires inpatient telemetry monitoring with formal evaluation by cardiology to determine if patient needs catheterization vs holter monitoring
circumcision

## 2022-02-15 NOTE — DISCHARGE NOTE ADULT - MEDICATION SUMMARY - MEDICATIONS TO TAKE
Universal Safety Interventions
I will START or STAY ON the medications listed below when I get home from the hospital:    aspirin 81 mg oral delayed release tablet  -- 1 tab(s) by mouth once a day  -- Indication: For heart Health     losartan 25 mg oral tablet  -- 1 tab(s) by mouth once a day  -- Indication: For hypertension     isosorbide mononitrate 30 mg oral tablet, extended release  -- 1 tab(s) by mouth once a day  -- Indication: For Chest pain     atorvastatin 20 mg oral tablet  -- 1 tab(s) by mouth once a day (at bedtime)  -- Indication: For High Cholesterol     Pepcid 20 mg oral tablet  -- 1 tab(s) by mouth 2 times a day   -- It is very important that you take or use this exactly as directed.  Do not skip doses or discontinue unless directed by your doctor.  Obtain medical advice before taking any non-prescription drugs as some may affect the action of this medication.    -- Indication: For Heart Burn

## 2022-06-02 NOTE — ED ADULT TRIAGE NOTE - HEART RATE (BEATS/MIN)
86 Island Pedicle Flap With Canthal Suspension Text: The defect edges were debeveled with a #15 scalpel blade.  Given the location of the defect, shape of the defect and the proximity to free margins an island pedicle advancement flap was deemed most appropriate.  Using a sterile surgical marker, an appropriate advancement flap was drawn incorporating the defect, outlining the appropriate donor tissue and placing the expected incisions within the relaxed skin tension lines where possible. The area thus outlined was incised deep to adipose tissue with a #15 scalpel blade.  The skin margins were undermined to an appropriate distance in all directions around the primary defect and laterally outward around the island pedicle utilizing iris scissors.  There was minimal undermining beneath the pedicle flap. A suspension suture was placed in the canthal tendon to prevent tension and prevent ectropion.

## 2022-06-15 ENCOUNTER — EMERGENCY (EMERGENCY)
Facility: HOSPITAL | Age: 67
LOS: 0 days | Discharge: HOME | End: 2022-06-15
Attending: STUDENT IN AN ORGANIZED HEALTH CARE EDUCATION/TRAINING PROGRAM | Admitting: STUDENT IN AN ORGANIZED HEALTH CARE EDUCATION/TRAINING PROGRAM
Payer: MEDICARE

## 2022-06-15 VITALS
RESPIRATION RATE: 20 BRPM | HEART RATE: 74 BPM | OXYGEN SATURATION: 99 % | DIASTOLIC BLOOD PRESSURE: 92 MMHG | SYSTOLIC BLOOD PRESSURE: 158 MMHG

## 2022-06-15 VITALS
HEIGHT: 67 IN | SYSTOLIC BLOOD PRESSURE: 163 MMHG | DIASTOLIC BLOOD PRESSURE: 99 MMHG | HEART RATE: 89 BPM | OXYGEN SATURATION: 95 % | TEMPERATURE: 99 F | WEIGHT: 184.97 LBS | RESPIRATION RATE: 18 BRPM

## 2022-06-15 DIAGNOSIS — I10 ESSENTIAL (PRIMARY) HYPERTENSION: ICD-10-CM

## 2022-06-15 DIAGNOSIS — Z98.890 OTHER SPECIFIED POSTPROCEDURAL STATES: Chronic | ICD-10-CM

## 2022-06-15 DIAGNOSIS — Z82.49 FAMILY HISTORY OF ISCHEMIC HEART DISEASE AND OTHER DISEASES OF THE CIRCULATORY SYSTEM: ICD-10-CM

## 2022-06-15 DIAGNOSIS — E78.00 PURE HYPERCHOLESTEROLEMIA, UNSPECIFIED: ICD-10-CM

## 2022-06-15 DIAGNOSIS — Z20.822 CONTACT WITH AND (SUSPECTED) EXPOSURE TO COVID-19: ICD-10-CM

## 2022-06-15 LAB
ALBUMIN SERPL ELPH-MCNC: 4.2 G/DL — SIGNIFICANT CHANGE UP (ref 3.5–5.2)
ALP SERPL-CCNC: 44 U/L — SIGNIFICANT CHANGE UP (ref 30–115)
ALT FLD-CCNC: 32 U/L — SIGNIFICANT CHANGE UP (ref 0–41)
ANION GAP SERPL CALC-SCNC: 14 MMOL/L — SIGNIFICANT CHANGE UP (ref 7–14)
APTT BLD: 32.5 SEC — SIGNIFICANT CHANGE UP (ref 27–39.2)
AST SERPL-CCNC: 43 U/L — HIGH (ref 0–41)
BASOPHILS # BLD AUTO: 0.08 K/UL — SIGNIFICANT CHANGE UP (ref 0–0.2)
BASOPHILS NFR BLD AUTO: 0.9 % — SIGNIFICANT CHANGE UP (ref 0–1)
BILIRUB SERPL-MCNC: 0.5 MG/DL — SIGNIFICANT CHANGE UP (ref 0.2–1.2)
BUN SERPL-MCNC: 15 MG/DL — SIGNIFICANT CHANGE UP (ref 10–20)
CALCIUM SERPL-MCNC: 9.2 MG/DL — SIGNIFICANT CHANGE UP (ref 8.5–10.1)
CHLORIDE SERPL-SCNC: 105 MMOL/L — SIGNIFICANT CHANGE UP (ref 98–110)
CO2 SERPL-SCNC: 23 MMOL/L — SIGNIFICANT CHANGE UP (ref 17–32)
CREAT SERPL-MCNC: 1 MG/DL — SIGNIFICANT CHANGE UP (ref 0.7–1.5)
EGFR: 84 ML/MIN/1.73M2 — SIGNIFICANT CHANGE UP
EOSINOPHIL # BLD AUTO: 0.07 K/UL — SIGNIFICANT CHANGE UP (ref 0–0.7)
EOSINOPHIL NFR BLD AUTO: 0.8 % — SIGNIFICANT CHANGE UP (ref 0–8)
GLUCOSE SERPL-MCNC: 126 MG/DL — HIGH (ref 70–99)
HCT VFR BLD CALC: 45.2 % — SIGNIFICANT CHANGE UP (ref 42–52)
HGB BLD-MCNC: 15.6 G/DL — SIGNIFICANT CHANGE UP (ref 14–18)
IMM GRANULOCYTES NFR BLD AUTO: 0.4 % — HIGH (ref 0.1–0.3)
INR BLD: 1.01 RATIO — SIGNIFICANT CHANGE UP (ref 0.65–1.3)
LYMPHOCYTES # BLD AUTO: 1.74 K/UL — SIGNIFICANT CHANGE UP (ref 1.2–3.4)
LYMPHOCYTES # BLD AUTO: 19.1 % — LOW (ref 20.5–51.1)
MCHC RBC-ENTMCNC: 31.2 PG — HIGH (ref 27–31)
MCHC RBC-ENTMCNC: 34.5 G/DL — SIGNIFICANT CHANGE UP (ref 32–37)
MCV RBC AUTO: 90.4 FL — SIGNIFICANT CHANGE UP (ref 80–94)
MONOCYTES # BLD AUTO: 0.48 K/UL — SIGNIFICANT CHANGE UP (ref 0.1–0.6)
MONOCYTES NFR BLD AUTO: 5.3 % — SIGNIFICANT CHANGE UP (ref 1.7–9.3)
NEUTROPHILS # BLD AUTO: 6.71 K/UL — HIGH (ref 1.4–6.5)
NEUTROPHILS NFR BLD AUTO: 73.5 % — SIGNIFICANT CHANGE UP (ref 42.2–75.2)
NRBC # BLD: 0 /100 WBCS — SIGNIFICANT CHANGE UP (ref 0–0)
PLATELET # BLD AUTO: 204 K/UL — SIGNIFICANT CHANGE UP (ref 130–400)
POTASSIUM SERPL-MCNC: 4.2 MMOL/L — SIGNIFICANT CHANGE UP (ref 3.5–5)
POTASSIUM SERPL-SCNC: 4.2 MMOL/L — SIGNIFICANT CHANGE UP (ref 3.5–5)
PROT SERPL-MCNC: 6.4 G/DL — SIGNIFICANT CHANGE UP (ref 6–8)
PROTHROM AB SERPL-ACNC: 11.6 SEC — SIGNIFICANT CHANGE UP (ref 9.95–12.87)
RBC # BLD: 5 M/UL — SIGNIFICANT CHANGE UP (ref 4.7–6.1)
RBC # FLD: 12.5 % — SIGNIFICANT CHANGE UP (ref 11.5–14.5)
SARS-COV-2 RNA SPEC QL NAA+PROBE: SIGNIFICANT CHANGE UP
SODIUM SERPL-SCNC: 142 MMOL/L — SIGNIFICANT CHANGE UP (ref 135–146)
TROPONIN T SERPL-MCNC: <0.01 NG/ML — SIGNIFICANT CHANGE UP
WBC # BLD: 9.12 K/UL — SIGNIFICANT CHANGE UP (ref 4.8–10.8)
WBC # FLD AUTO: 9.12 K/UL — SIGNIFICANT CHANGE UP (ref 4.8–10.8)

## 2022-06-15 PROCEDURE — 71045 X-RAY EXAM CHEST 1 VIEW: CPT | Mod: 26

## 2022-06-15 PROCEDURE — 99285 EMERGENCY DEPT VISIT HI MDM: CPT | Mod: FS

## 2022-06-15 PROCEDURE — 93010 ELECTROCARDIOGRAM REPORT: CPT

## 2022-06-15 NOTE — ED PROVIDER NOTE - OBJECTIVE STATEMENT
this is a  67 yo male presents to ed for evaluation. patient state he noticed his blood pressure was higher than normal. patient denies any chest pain or shortness of breath.

## 2022-06-15 NOTE — ED PROVIDER NOTE - NS ED ROS FT
Review of Systems:  	•	CONSTITUTIONAL - no fever, no diaphoresis, no chills    	•	RESPIRATORY - no shortness of breath, no cough  	•	CARDIAC - no chest pain, no palpitations  	•	GI - no abd pain, no nausea, no vomiting, no diarrhea, no constipation  	•	GENITO-URINARY - no discharge, no dysuria; no hematuria, no increased urinary frequency  	•	MUSCULOSKELETAL - no joint paint, no swelling, no redness  	•	NEUROLOGIC - no weakness, no headache, no paresthesias, no LOC

## 2022-06-15 NOTE — ED PROVIDER NOTE - PATIENT PORTAL LINK FT
You can access the FollowMyHealth Patient Portal offered by [facility] by registering at the following website: [email or web address]. By joining [facility]’s FollowMyHealth portal, you will also be able to view your health information using other applications (apps) compatible with our system.

## 2022-06-15 NOTE — ED PROVIDER NOTE - NS ED ATTENDING STATEMENT MOD
This was a shared visit with the THUY. I reviewed and verified the documentation and independently performed the documented:

## 2022-06-15 NOTE — ED ADULT NURSE NOTE - NSIMPLEMENTINTERV_GEN_ALL_ED
Implemented All Universal Safety Interventions:  [place] to call system. Call bell, personal items and telephone within reach. Instruct patient to call for assistance. Room bathroom lighting operational. Non-slip footwear when patient is off stretcher. Physically safe environment: no spills, clutter or unnecessary equipment. Stretcher in lowest position, wheels locked, appropriate side rails in place.

## 2022-06-15 NOTE — ED ADULT NURSE NOTE - NSICDXPASTMEDICALHX_GEN_ALL_CORE_FT
PAST MEDICAL HISTORY:  Appendicitis     DVT (deep venous thrombosis)     High cholesterol     HTN (hypertension)

## 2022-06-15 NOTE — ED ADULT TRIAGE NOTE - CHIEF COMPLAINT QUOTE
pt states "about 45 minutes ago, my face got really red; I took my BP and it was high; both feet/toes started feeling tingling; both fingertips tingly; both sides of face were twitching". symptoms calmed down but did not disappear

## 2022-06-15 NOTE — ED PROVIDER NOTE - CLINICAL SUMMARY MEDICAL DECISION MAKING FREE TEXT BOX
66M p/w elevated BP reading outpatient. He denies CP, SOB, focal weakness or numbness, decreaed urination. EKG, labs and imaging reviewed. On reassessment pt well appearing, has f/u with PCP. Was given return precautions, f/u instructions, and he verbalizes understanding. I have fully discussed the medical management and delivery of care with the patient. I have discussed any available labs, imaging and treatment options with the patient. All Questions answered at the bedside and printed copies of all results provided and recommended to review with PCP. Patient confirms understanding and has been given detailed return precautions. Patient instructed to return to the ED should symptoms persist or worsen. Patient has demonstrated capacity and has verbalized understanding. Patient is well appearing upon discharge, ambulatory with a steady gait.

## 2022-06-15 NOTE — ED PROVIDER NOTE - WR ORDER NAME 1
· Random glucose noted to be 150 on admission  · Last hgbA1c 8/2019 - 5 3%  · Obtain repeat hgbA1c as patient with persistent hyperglycemia here  · QID glucose checks  · Monitor, consider adding low dose PRN SSI coverage Xray Chest 1 View-PORTABLE IMMEDIATE

## 2022-07-11 ENCOUNTER — EMERGENCY (EMERGENCY)
Facility: HOSPITAL | Age: 67
LOS: 0 days | Discharge: HOME | End: 2022-07-12
Attending: STUDENT IN AN ORGANIZED HEALTH CARE EDUCATION/TRAINING PROGRAM | Admitting: STUDENT IN AN ORGANIZED HEALTH CARE EDUCATION/TRAINING PROGRAM

## 2022-07-11 VITALS
SYSTOLIC BLOOD PRESSURE: 160 MMHG | RESPIRATION RATE: 18 BRPM | OXYGEN SATURATION: 98 % | HEART RATE: 74 BPM | TEMPERATURE: 97 F | DIASTOLIC BLOOD PRESSURE: 94 MMHG

## 2022-07-11 VITALS — HEIGHT: 67 IN | WEIGHT: 194.01 LBS

## 2022-07-11 DIAGNOSIS — Z90.49 ACQUIRED ABSENCE OF OTHER SPECIFIED PARTS OF DIGESTIVE TRACT: ICD-10-CM

## 2022-07-11 DIAGNOSIS — I10 ESSENTIAL (PRIMARY) HYPERTENSION: ICD-10-CM

## 2022-07-11 DIAGNOSIS — E78.00 PURE HYPERCHOLESTEROLEMIA, UNSPECIFIED: ICD-10-CM

## 2022-07-11 DIAGNOSIS — Z98.890 OTHER SPECIFIED POSTPROCEDURAL STATES: Chronic | ICD-10-CM

## 2022-07-11 DIAGNOSIS — R21 RASH AND OTHER NONSPECIFIC SKIN ERUPTION: ICD-10-CM

## 2022-07-11 DIAGNOSIS — Z86.718 PERSONAL HISTORY OF OTHER VENOUS THROMBOSIS AND EMBOLISM: ICD-10-CM

## 2022-07-11 PROCEDURE — 99283 EMERGENCY DEPT VISIT LOW MDM: CPT | Mod: FS

## 2022-07-11 NOTE — ED PROVIDER NOTE - PATIENT PORTAL LINK FT
You can access the FollowMyHealth Patient Portal offered by [facility] by registering at the following website: [email or web address]. By joining [facility]’s FollowMyHealth portal, you will also be able to view your health information using other applications (apps) compatible with our system.

## 2022-07-11 NOTE — ED PROVIDER NOTE - OBJECTIVE STATEMENT
66-year-old male past medical history of hypertension denies history of diabetes comes to the emergency room for scrotal rash.  Patient states that symptoms started several days ago was described as a burning red rash on scrotum.  Patient was started on triamcinolone cream which improved symptoms completely as of yesterday.  Patient patient states today symptoms returned despite cream the symptoms persisted.  Patient denies fever chills patient denies testicular pain

## 2022-07-11 NOTE — ED PROVIDER NOTE - NSFOLLOWUPINSTRUCTIONS_ED_ALL_ED_FT
Follow up with your dermatologist 1-2 days    Acute Rash    WHAT YOU NEED TO KNOW:    A rash is irritation, redness, or itchiness in the skin or mucus membranes. Mucus membranes are areas such as the lining of your nose or throat. Acute means the rash starts suddenly, worsens quickly, and lasts a short time. An acute rash may be caused by a disease, such as hepatitis or vasculitis. The rash may be a reaction to something you are allergic to, such as certain foods, or latex. Certain medicines, including antibiotics, NSAIDs, prescription pain medicine, and aspirin can also cause a rash.     DISCHARGE INSTRUCTIONS:    Return to the emergency department if:     You have sudden trouble breathing or chest pain.      You are vomiting, have a headache or muscle aches, and your throat hurts.    Contact your healthcare provider if:     You have a fever.      You get open wounds from scratching your skin, or you have a wound that is red, swollen, or painful.       Your rash lasts longer than 3 months.      You have swelling or pain in your joints.       You have questions or concerns about your condition or care.    Medicines: If your rash does not go away on its own, you may need the following medicines:     Antihistamines may be given to help decrease itching.       Steroids may be given to decrease inflammation.      Antibiotics help fight or prevent a bacterial infection.       Take your medicine as directed. Contact your healthcare provider if you think your medicine is not helping or if you have side effects. Tell him of her if you are allergic to any medicine. Keep a list of the medicines, vitamins, and herbs you take. Include the amounts, and when and why you take them. Bring the list or the pill bottles to follow-up visits. Carry your medicine list with you in case of an emergency.    Prevent a rash or care for your skin when you have a rash: Dry skin can lead to more problems. Do not scratch your skin if it itches. You may cause a skin infection by scratching. The following may prevent dry skin, and help your skin look better:     Use thick cream lotions or petroleum jelly to help soothe your rash. These products work well on areas with thick skin, such as your feet. Cool compresses may also be used to soothe your skin. Apply a cool compress or a cool, wet towel, and then cover it with a dry towel.       Use lukewarm water when you bathe. Hot water may damage your skin more. Pat your skin dry. Do not rub your skin with a towel.       Use detergents, soaps, shampoos, and bubble baths made for sensitive skin. Wear clothes that are made of cotton instead of nylon or wool. Cotton is softer, so it will not hurt your skin as much.    Follow up with your healthcare provider as directed: You may need to see a dermatologist if healthcare providers do not know what is causing your rash. You may also need to see a dermatologist if your rash does not get better even with treatment. You may need to see a dietitian if you have allergies to foods. Write down your questions so you remember to ask them during your visits.       © Copyright [employer] 2019 All illustrations and images included in CareNotes are the copyrighted property of A.D.A.M., Inc. or [employer]

## 2022-07-11 NOTE — ED PROVIDER NOTE - CLINICAL SUMMARY MEDICAL DECISION MAKING FREE TEXT BOX
will treat fungal scrotal infection  discussed need to f/u w/ derm tomorrow  discussed return precautions

## 2022-07-11 NOTE — ED PROVIDER NOTE - PHYSICAL EXAMINATION
Physical Exam    Vital Signs: I have reviewed the initial vital signs.  Constitutional: well-nourished, appears stated age, no acute distress  Musculoskeletal: supple neck, no lower extremity edema, no midline tenderness  Integumentary: warm, dry, no rash  : scrotal erythema, no purulence/discharge, mild erythema to base of penis, small areas of erythema to suprapubic region (chaperone Dr. Brink)  Neurologic: awake, alert, cranial nerves II-XII grossly intact, extremities’ motor and sensory functions grossly intact  Psychiatric: appropriate mood, appropriate affect

## 2022-07-11 NOTE — ED PROVIDER NOTE - NS ED ROS FT
Constitutional: (-) fever  Musculoskeletal: (-) neck pain, (-) back pain, (-) joint pain  Integumentary: (+) rash, (-) edema  Neurological: (-) headache, (-) altered mental status

## 2022-07-11 NOTE — ED PROVIDER NOTE - ATTENDING APP SHARED VISIT CONTRIBUTION OF CARE
67 yo m hx htn presents for eval of scrotal rash. sx started several days ago. pt saw derm who started triamcinolone cream which improved sx completely.  pt states this am, sx returned. pt used the remaining cream but sx persisted. pt endorses raw pain to scrotum.  no teste pain/fever/chills. pt denies hx dm.      vss  gen- NAD, aaox3  card-rrr  lungs-ctab, no wheezing or rhonchi  abd-sntnd, no guarding or rebound  - scrotal erythema, no purulence/discharge, mild erythema to base of penis, small areas of erythema to suprapubic region  neuro- full str/sensation, cn ii-xii grossly intact, normal coordination and gait

## 2022-08-09 ENCOUNTER — EMERGENCY (EMERGENCY)
Facility: HOSPITAL | Age: 67
LOS: 0 days | Discharge: HOME | End: 2022-08-10
Attending: EMERGENCY MEDICINE | Admitting: EMERGENCY MEDICINE

## 2022-08-09 VITALS
HEIGHT: 67 IN | WEIGHT: 195.11 LBS | SYSTOLIC BLOOD PRESSURE: 159 MMHG | RESPIRATION RATE: 18 BRPM | HEART RATE: 95 BPM | OXYGEN SATURATION: 99 % | DIASTOLIC BLOOD PRESSURE: 80 MMHG | TEMPERATURE: 98 F

## 2022-08-09 DIAGNOSIS — Z86.711 PERSONAL HISTORY OF PULMONARY EMBOLISM: ICD-10-CM

## 2022-08-09 DIAGNOSIS — Z20.822 CONTACT WITH AND (SUSPECTED) EXPOSURE TO COVID-19: ICD-10-CM

## 2022-08-09 DIAGNOSIS — I10 ESSENTIAL (PRIMARY) HYPERTENSION: ICD-10-CM

## 2022-08-09 DIAGNOSIS — Z86.718 PERSONAL HISTORY OF OTHER VENOUS THROMBOSIS AND EMBOLISM: ICD-10-CM

## 2022-08-09 DIAGNOSIS — Z79.01 LONG TERM (CURRENT) USE OF ANTICOAGULANTS: ICD-10-CM

## 2022-08-09 DIAGNOSIS — R07.89 OTHER CHEST PAIN: ICD-10-CM

## 2022-08-09 DIAGNOSIS — Z98.890 OTHER SPECIFIED POSTPROCEDURAL STATES: Chronic | ICD-10-CM

## 2022-08-09 DIAGNOSIS — E78.5 HYPERLIPIDEMIA, UNSPECIFIED: ICD-10-CM

## 2022-08-09 DIAGNOSIS — F17.200 NICOTINE DEPENDENCE, UNSPECIFIED, UNCOMPLICATED: ICD-10-CM

## 2022-08-09 DIAGNOSIS — B37.2 CANDIDIASIS OF SKIN AND NAIL: ICD-10-CM

## 2022-08-09 DIAGNOSIS — R07.9 CHEST PAIN, UNSPECIFIED: ICD-10-CM

## 2022-08-09 LAB
ALBUMIN SERPL ELPH-MCNC: 4.2 G/DL — SIGNIFICANT CHANGE UP (ref 3.5–5.2)
ALP SERPL-CCNC: 52 U/L — SIGNIFICANT CHANGE UP (ref 30–115)
ALT FLD-CCNC: 36 U/L — SIGNIFICANT CHANGE UP (ref 0–41)
ANION GAP SERPL CALC-SCNC: 11 MMOL/L — SIGNIFICANT CHANGE UP (ref 7–14)
AST SERPL-CCNC: 27 U/L — SIGNIFICANT CHANGE UP (ref 0–41)
BASOPHILS # BLD AUTO: 0.08 K/UL — SIGNIFICANT CHANGE UP (ref 0–0.2)
BASOPHILS NFR BLD AUTO: 0.9 % — SIGNIFICANT CHANGE UP (ref 0–1)
BILIRUB SERPL-MCNC: 0.6 MG/DL — SIGNIFICANT CHANGE UP (ref 0.2–1.2)
BUN SERPL-MCNC: 14 MG/DL — SIGNIFICANT CHANGE UP (ref 10–20)
CALCIUM SERPL-MCNC: 9.2 MG/DL — SIGNIFICANT CHANGE UP (ref 8.5–10.1)
CHLORIDE SERPL-SCNC: 105 MMOL/L — SIGNIFICANT CHANGE UP (ref 98–110)
CO2 SERPL-SCNC: 26 MMOL/L — SIGNIFICANT CHANGE UP (ref 17–32)
CREAT SERPL-MCNC: 1 MG/DL — SIGNIFICANT CHANGE UP (ref 0.7–1.5)
D DIMER BLD IA.RAPID-MCNC: <150 NG/ML DDU — SIGNIFICANT CHANGE UP (ref 0–230)
EGFR: 84 ML/MIN/1.73M2 — SIGNIFICANT CHANGE UP
EOSINOPHIL # BLD AUTO: 0.1 K/UL — SIGNIFICANT CHANGE UP (ref 0–0.7)
EOSINOPHIL NFR BLD AUTO: 1.1 % — SIGNIFICANT CHANGE UP (ref 0–8)
GLUCOSE SERPL-MCNC: 97 MG/DL — SIGNIFICANT CHANGE UP (ref 70–99)
HCT VFR BLD CALC: 43.3 % — SIGNIFICANT CHANGE UP (ref 42–52)
HGB BLD-MCNC: 15.9 G/DL — SIGNIFICANT CHANGE UP (ref 14–18)
IMM GRANULOCYTES NFR BLD AUTO: 0.5 % — HIGH (ref 0.1–0.3)
LYMPHOCYTES # BLD AUTO: 1.97 K/UL — SIGNIFICANT CHANGE UP (ref 1.2–3.4)
LYMPHOCYTES # BLD AUTO: 21.5 % — SIGNIFICANT CHANGE UP (ref 20.5–51.1)
MCHC RBC-ENTMCNC: 32.6 PG — HIGH (ref 27–31)
MCHC RBC-ENTMCNC: 36.7 G/DL — SIGNIFICANT CHANGE UP (ref 32–37)
MCV RBC AUTO: 88.7 FL — SIGNIFICANT CHANGE UP (ref 80–94)
MONOCYTES # BLD AUTO: 0.65 K/UL — HIGH (ref 0.1–0.6)
MONOCYTES NFR BLD AUTO: 7.1 % — SIGNIFICANT CHANGE UP (ref 1.7–9.3)
NEUTROPHILS # BLD AUTO: 6.33 K/UL — SIGNIFICANT CHANGE UP (ref 1.4–6.5)
NEUTROPHILS NFR BLD AUTO: 68.9 % — SIGNIFICANT CHANGE UP (ref 42.2–75.2)
NRBC # BLD: 0 /100 WBCS — SIGNIFICANT CHANGE UP (ref 0–0)
NT-PROBNP SERPL-SCNC: 76 PG/ML — SIGNIFICANT CHANGE UP (ref 0–300)
PLATELET # BLD AUTO: 188 K/UL — SIGNIFICANT CHANGE UP (ref 130–400)
POTASSIUM SERPL-MCNC: 3.9 MMOL/L — SIGNIFICANT CHANGE UP (ref 3.5–5)
POTASSIUM SERPL-SCNC: 3.9 MMOL/L — SIGNIFICANT CHANGE UP (ref 3.5–5)
PROT SERPL-MCNC: 6.6 G/DL — SIGNIFICANT CHANGE UP (ref 6–8)
RBC # BLD: 4.88 M/UL — SIGNIFICANT CHANGE UP (ref 4.7–6.1)
RBC # FLD: 12.7 % — SIGNIFICANT CHANGE UP (ref 11.5–14.5)
SODIUM SERPL-SCNC: 142 MMOL/L — SIGNIFICANT CHANGE UP (ref 135–146)
TROPONIN T SERPL-MCNC: <0.01 NG/ML — SIGNIFICANT CHANGE UP
WBC # BLD: 9.18 K/UL — SIGNIFICANT CHANGE UP (ref 4.8–10.8)
WBC # FLD AUTO: 9.18 K/UL — SIGNIFICANT CHANGE UP (ref 4.8–10.8)

## 2022-08-09 PROCEDURE — 93010 ELECTROCARDIOGRAM REPORT: CPT

## 2022-08-09 PROCEDURE — 93970 EXTREMITY STUDY: CPT | Mod: 26

## 2022-08-09 PROCEDURE — 99220: CPT | Mod: FS

## 2022-08-09 PROCEDURE — 71275 CT ANGIOGRAPHY CHEST: CPT | Mod: 26,MA

## 2022-08-09 PROCEDURE — 71046 X-RAY EXAM CHEST 2 VIEWS: CPT | Mod: 26

## 2022-08-09 RX ORDER — FLUCONAZOLE 150 MG/1
150 TABLET ORAL ONCE
Refills: 0 | Status: COMPLETED | OUTPATIENT
Start: 2022-08-09 | End: 2022-08-09

## 2022-08-09 RX ADMIN — FLUCONAZOLE 150 MILLIGRAM(S): 150 TABLET ORAL at 23:35

## 2022-08-09 NOTE — ED ADULT NURSE NOTE - OBJECTIVE STATEMENT
Pt presents to ED c/o chest pain, SOB, and RLE pain. Pt has a hx of clot in the R leg; was on xarelto for clot. Pt is awake alert and not in any distress.

## 2022-08-09 NOTE — ED PROVIDER NOTE - CLINICAL SUMMARY MEDICAL DECISION MAKING FREE TEXT BOX
Labs ok. CTA negative for PE. Duplex with chronic LLE DVT, nothing acute. Pt very anxious--prefers to stay in CDU for nuclear stress test tomorrow.

## 2022-08-09 NOTE — ED PROVIDER NOTE - NS ED ROS FT
Constitutional:  See HPI  Eyes:  No visual changes  ENMT: No neck pain or stiffness  Cardiac:  see HPI  Respiratory:  No cough or respiratory distress.   GI:  No nausea, vomiting, diarrhea or abdominal pain.  :  No dysuria, frequency or burning.  MS:  No back pain.  Neuro:  No headache   Skin:  burning, itchy rash over the scrotum  Except as documented in the HPI,  all other systems are negative

## 2022-08-09 NOTE — ED PROVIDER NOTE - CARE PLAN
1 Principal Discharge DX:	Chest pain   Principal Discharge DX:	Chest pain  Secondary Diagnosis:	Candida rash of groin

## 2022-08-09 NOTE — ED PROVIDER NOTE - PHYSICAL EXAMINATION
CONSTITUTIONAL: alert, anxious but nontoxic appearing  SKIN: Warm dry  HEAD: NCAT  EYES: NL inspection  ENT: MMM  NECK: Supple; non tender.  CARD: S1S2 RRR, no murmur  RESP: CTAB/L  ABD: S/NT no R/G  : erythematous rash, skin looks raw over the scrotum and posteriorly to the anus, c/w candida  EXT: no pedal edema, no appreciable LLE swelling (though pt insists that it feels"tight")  NEURO: Grossly unremarkable  PSYCH: Cooperative, appropriate.

## 2022-08-09 NOTE — ED PROVIDER NOTE - OBJECTIVE STATEMENT
67yo man h/o HTN, HLD, DVT/PE on Xarelto with h/o "clots even on blood thinners" got Pfizer COVID booster 10 days ago and now complains of congestion in his chest and sharp, shooting pain going from R to L and into his L arm similar to prior PE. No SOB, nausea, vomiting, sweating. No pain with inspiration. Nonsmoker.

## 2022-08-10 VITALS
HEART RATE: 60 BPM | SYSTOLIC BLOOD PRESSURE: 144 MMHG | TEMPERATURE: 98 F | OXYGEN SATURATION: 99 % | DIASTOLIC BLOOD PRESSURE: 81 MMHG | RESPIRATION RATE: 19 BRPM

## 2022-08-10 LAB
SARS-COV-2 RNA SPEC QL NAA+PROBE: SIGNIFICANT CHANGE UP
TROPONIN T SERPL-MCNC: <0.01 NG/ML — SIGNIFICANT CHANGE UP

## 2022-08-10 PROCEDURE — 78452 HT MUSCLE IMAGE SPECT MULT: CPT | Mod: 26,MF

## 2022-08-10 PROCEDURE — G1004: CPT

## 2022-08-10 PROCEDURE — 76705 ECHO EXAM OF ABDOMEN: CPT | Mod: 26

## 2022-08-10 PROCEDURE — 93018 CV STRESS TEST I&R ONLY: CPT

## 2022-08-10 PROCEDURE — 93016 CV STRESS TEST SUPVJ ONLY: CPT

## 2022-08-10 PROCEDURE — 93010 ELECTROCARDIOGRAM REPORT: CPT

## 2022-08-10 PROCEDURE — 99217: CPT

## 2022-08-10 RX ORDER — MORPHINE SULFATE 50 MG/1
6 CAPSULE, EXTENDED RELEASE ORAL ONCE
Refills: 0 | Status: DISCONTINUED | OUTPATIENT
Start: 2022-08-10 | End: 2022-08-10

## 2022-08-10 RX ORDER — FAMOTIDINE 10 MG/ML
20 INJECTION INTRAVENOUS DAILY
Refills: 0 | Status: DISCONTINUED | OUTPATIENT
Start: 2022-08-10 | End: 2022-08-09

## 2022-08-10 RX ORDER — ADENOSINE 3 MG/ML
60 INJECTION INTRAVENOUS ONCE
Refills: 0 | Status: DISCONTINUED | OUTPATIENT
Start: 2022-08-10 | End: 2022-08-09

## 2022-08-10 RX ADMIN — FAMOTIDINE 20 MILLIGRAM(S): 10 INJECTION INTRAVENOUS at 15:55

## 2022-08-10 RX ADMIN — MORPHINE SULFATE 6 MILLIGRAM(S): 50 CAPSULE, EXTENDED RELEASE ORAL at 15:55

## 2022-08-10 NOTE — ED CDU PROVIDER INITIAL DAY NOTE - MEDICAL DECISION MAKING DETAILS
Patient placed in  observation unit  for chest pain work-up, testing negative including stress test.  Stable for discharge home.

## 2022-08-10 NOTE — ED CDU PROVIDER DISPOSITION NOTE - CLINICAL COURSE
Patient placed in observation for chest pain work-up.  ED testing included labs, serial cardiac enzymes, EKG (nonischemic), CTA chest was done and negative for PE, lower extremity duplex negative for acute DVT.  Patient had an unremarkable EDOU stay, had a nuclear test today and that was negative as well.  Stable for discharge home, advised to follow-up with cardiology outpatient, strict return precautions given. Patient placed in observation for chest pain work-up.  ED testing included labs, serial cardiac enzymes, EKG (nonischemic), CTA chest was done and negative for PE, lower extremity duplex negative for acute DVT.  Patient had an unremarkable EDOU stay, had a nuclear test today and that was negative as well.  Pt then mentioned RUQ abd pain and was sent for RUQ ultrasound, which did not show any acute pathology.  Stable for discharge home, advised to follow-up with cardiology outpatient, strict return precautions given.

## 2022-08-10 NOTE — ED CDU PROVIDER DISPOSITION NOTE - PATIENT PORTAL LINK FT
You can access the FollowMyHealth Patient Portal offered by [facility] by registering at the following website: [email or web address]. By joining [employer]’s FollowMyHealth portal, you will also be able to view your health information using other applications (apps) compatible with our system.

## 2022-08-10 NOTE — ED CDU PROVIDER DISPOSITION NOTE - CARE PROVIDER_API CALL
Jeramy Garcias)  Cardiovascular Disease; Internal Medicine; Interventional Cardiology; Nuclear Cardiology  [address] [place] [address], [place] [address]  Phone: [phone]  Fax: [phone]  Follow Up Time: 4-6 Days    your PMD next week,   Phone: (   )    -  Fax: (   )    -  Follow Up Time:

## 2022-08-10 NOTE — ED CDU PROVIDER DISPOSITION NOTE - NSFOLLOWUPINSTRUCTIONS_ED_ALL_ED_FT
Chest Pain    Chest pain can be caused by many different conditions which may or may not be dangerous. Causes include heartburn, lung infections, heart attack, blood clot in lungs, skin infections, strain or damage to muscle, cartilage, or bones, etc. In addition to a history and physical examination, an electrocardiogram (ECG) or other lab tests may have been performed to determine the cause of your chest pain. Follow up with your primary care provider or with a cardiologist as instructed.     SEEK IMMEDIATE MEDICAL CARE IF YOU HAVE ANY OF THE FOLLOWING SYMPTOMS: worsening chest pain, coughing up blood, unexplained back/neck/jaw pain, severe abdominal pain, dizziness or lightheadedness, fainting, shortness of breath, sweaty or clammy skin, vomiting, or racing heart beat. These symptoms may represent a serious problem that is an emergency. Do not wait to see if the symptoms will go away. Get medical help right away. Call 911 and do not drive yourself to the hospital.  Rash    A rash is a change in the color of the skin. A rash can also change the way your skin feels. There are many different conditions and factors that can cause a rash, most of which are not dangerous. Make sure to follow up with your primary care physician or a dermatologist as instructed by your health care provider.    SEEK IMMEDIATE MEDICAL CARE IF YOU HAVE ANY OF THE FOLLOWING SYMPTOMS: fever, blisters, a rash inside your mouth, vaginal or anal pain, or altered mental status.

## 2022-08-10 NOTE — ED CDU PROVIDER DISPOSITION NOTE - PROVIDER TOKENS
PROVIDER:[TOKEN:[[id number]],FOLLOWUP:[4-6 Days]],FREE:[LAST:[your PMD next week],PHONE:[(   )    -],FAX:[(   )    -]]

## 2022-08-10 NOTE — ED CDU PROVIDER INITIAL DAY NOTE - PROGRESS NOTE DETAILS
Patient endorsed to me this morning to follow-up nuclear stress test and dispo.  Patient feels very well, no complaints at present, testing so far negative, stress test is pending. Stress test is negative, patient stable for discharge home.  Serial cardiac enzymes is negative as well, EKG is nonischemic. Patient to be discharged from ED. Any available test results were discussed with patient and/or family. Verbal instructions given, including instructions to return to ED immediately for any new, worsening, or concerning symptoms. Patient endorsed understanding. Written discharge instructions additionally given, including follow-up plan.  Patient was given opportunity to ask questions. Just prior to disposition patient started complaining of right upper quadrant pain, states that he has a history of gallstones.  Right upper quadrant sono ordered, dose of analgesia was given.  Case endorsed to Dr. Klerman to follow-up imaging and dispo.

## 2022-08-10 NOTE — ED CDU PROVIDER INITIAL DAY NOTE - ATTENDING APP SHARED VISIT CONTRIBUTION OF CARE
66-year-old male PMH HTN, elevated cholesterol, DVT/PE on Xarelto placed in observation unit for evaluation of vague chest pain for the past several days.  Symptoms started after her COVID booster about 10 days ago.  No exertional complaints, no weight loss, no cough, unilateral leg swelling or any other additional complaints.  Well-appearing well-nourished, NAD, head AT/NC, PERRL, pink conjunctivae,  mmm, nml oropharynx, nml phonation without drooling or trismus, supple neck without midline spine ttp, nml work of breathing, lungs CTA b/l, equal air entry, RRR, well-perfused extremities, distal pulses intact, abdomen soft, NT/ND, BS present in all quadrants, no midline spine or CVA ttp, no leg edema or unilateral calf swelling, A&Ox3, no focal neuro deficits, nml mood and affect. Scheduled for stress test this morning.

## 2022-08-10 NOTE — ED CDU PROVIDER INITIAL DAY NOTE - OBJECTIVE STATEMENT
67 yo M with PMHX of HTN, HLD, and DVT/PE on Xarelto presents to the ED c/o mild substernal chest pain described as pressure/congested feeling that started 10 days ago after COVID booster. Symptoms are intermittent, unrelated to exertion and associated with shooting pain in B/L arms. Pt denies other complaints.

## 2022-11-07 NOTE — ED ADULT TRIAGE NOTE - MEANS OF ARRIVAL
ambulatory O-L Flap Text: The defect edges were debeveled with a #15 scalpel blade.  Given the location of the defect, shape of the defect and the proximity to free margins an O-L flap was deemed most appropriate.  Using a sterile surgical marker, an appropriate advancement flap was drawn incorporating the defect and placing the expected incisions within the relaxed skin tension lines where possible.    The area thus outlined was incised deep to adipose tissue with a #15 scalpel blade.  The skin margins were undermined to an appropriate distance in all directions utilizing iris scissors.

## 2022-12-12 NOTE — ED ADULT NURSE NOTE - CAS DISCH ACCOMP BY
Problem: Pain  Goal: Acceptable pain/comfort level is achieved/maintained at rest (based on Pain Behaviors Scale)  Description: This goal is used for patients who are not able to self-report pain and are assessed for pain using the Pain Behaviors Scale  Outcome: Outcome Not Met, Continue to Monitor     Problem: At Risk for Falls  Goal: # Patient does not fall  Outcome: Outcome Not Met, Continue to Monitor     Problem: Pressure Injury, Risk for  Goal: # Skin remains intact  Outcome: Outcome Not Met, Continue to Monitor     Problem: Pressure Injury Actual  Goal: # No deterioration in pressure injury (PI)  Outcome: Outcome Not Met, Continue to Monitor     Problem: Artificial Airway Management  Goal: # Maintains effective artificial airway  Outcome: Outcome Not Met, Continue to Monitor      Self

## 2023-08-31 ENCOUNTER — INPATIENT (INPATIENT)
Facility: HOSPITAL | Age: 68
LOS: 0 days | Discharge: HOME | DRG: 176 | End: 2023-09-01
Attending: INTERNAL MEDICINE | Admitting: INTERNAL MEDICINE
Payer: MEDICARE

## 2023-08-31 VITALS
SYSTOLIC BLOOD PRESSURE: 193 MMHG | WEIGHT: 212.08 LBS | OXYGEN SATURATION: 98 % | TEMPERATURE: 99 F | HEART RATE: 73 BPM | DIASTOLIC BLOOD PRESSURE: 105 MMHG | RESPIRATION RATE: 18 BRPM

## 2023-08-31 DIAGNOSIS — Z98.890 OTHER SPECIFIED POSTPROCEDURAL STATES: Chronic | ICD-10-CM

## 2023-08-31 DIAGNOSIS — I26.99 OTHER PULMONARY EMBOLISM WITHOUT ACUTE COR PULMONALE: ICD-10-CM

## 2023-08-31 LAB
ALBUMIN SERPL ELPH-MCNC: 4.2 G/DL — SIGNIFICANT CHANGE UP (ref 3.5–5.2)
ALP SERPL-CCNC: 64 U/L — SIGNIFICANT CHANGE UP (ref 30–115)
ALT FLD-CCNC: 30 U/L — SIGNIFICANT CHANGE UP (ref 0–41)
ANION GAP SERPL CALC-SCNC: 12 MMOL/L — SIGNIFICANT CHANGE UP (ref 7–14)
AST SERPL-CCNC: 26 U/L — SIGNIFICANT CHANGE UP (ref 0–41)
BASOPHILS # BLD AUTO: 0.09 K/UL — SIGNIFICANT CHANGE UP (ref 0–0.2)
BASOPHILS NFR BLD AUTO: 0.8 % — SIGNIFICANT CHANGE UP (ref 0–1)
BILIRUB SERPL-MCNC: 0.3 MG/DL — SIGNIFICANT CHANGE UP (ref 0.2–1.2)
BUN SERPL-MCNC: 18 MG/DL — SIGNIFICANT CHANGE UP (ref 10–20)
CALCIUM SERPL-MCNC: 9.1 MG/DL — SIGNIFICANT CHANGE UP (ref 8.4–10.5)
CHLORIDE SERPL-SCNC: 107 MMOL/L — SIGNIFICANT CHANGE UP (ref 98–110)
CO2 SERPL-SCNC: 24 MMOL/L — SIGNIFICANT CHANGE UP (ref 17–32)
CREAT SERPL-MCNC: 1 MG/DL — SIGNIFICANT CHANGE UP (ref 0.7–1.5)
EGFR: 83 ML/MIN/1.73M2 — SIGNIFICANT CHANGE UP
EOSINOPHIL # BLD AUTO: 0.08 K/UL — SIGNIFICANT CHANGE UP (ref 0–0.7)
EOSINOPHIL NFR BLD AUTO: 0.7 % — SIGNIFICANT CHANGE UP (ref 0–8)
GLUCOSE SERPL-MCNC: 101 MG/DL — HIGH (ref 70–99)
HCT VFR BLD CALC: 45.6 % — SIGNIFICANT CHANGE UP (ref 42–52)
HGB BLD-MCNC: 15.4 G/DL — SIGNIFICANT CHANGE UP (ref 14–18)
IMM GRANULOCYTES NFR BLD AUTO: 1 % — HIGH (ref 0.1–0.3)
LYMPHOCYTES # BLD AUTO: 1.76 K/UL — SIGNIFICANT CHANGE UP (ref 1.2–3.4)
LYMPHOCYTES # BLD AUTO: 15.5 % — LOW (ref 20.5–51.1)
MCHC RBC-ENTMCNC: 29.9 PG — SIGNIFICANT CHANGE UP (ref 27–31)
MCHC RBC-ENTMCNC: 33.8 G/DL — SIGNIFICANT CHANGE UP (ref 32–37)
MCV RBC AUTO: 88.5 FL — SIGNIFICANT CHANGE UP (ref 80–94)
MONOCYTES # BLD AUTO: 0.85 K/UL — HIGH (ref 0.1–0.6)
MONOCYTES NFR BLD AUTO: 7.5 % — SIGNIFICANT CHANGE UP (ref 1.7–9.3)
NEUTROPHILS # BLD AUTO: 8.48 K/UL — HIGH (ref 1.4–6.5)
NEUTROPHILS NFR BLD AUTO: 74.5 % — SIGNIFICANT CHANGE UP (ref 42.2–75.2)
NRBC # BLD: 0 /100 WBCS — SIGNIFICANT CHANGE UP (ref 0–0)
NT-PROBNP SERPL-SCNC: 153 PG/ML — SIGNIFICANT CHANGE UP (ref 0–300)
PLATELET # BLD AUTO: 213 K/UL — SIGNIFICANT CHANGE UP (ref 130–400)
PMV BLD: 10.6 FL — HIGH (ref 7.4–10.4)
POTASSIUM SERPL-MCNC: 4.2 MMOL/L — SIGNIFICANT CHANGE UP (ref 3.5–5)
POTASSIUM SERPL-SCNC: 4.2 MMOL/L — SIGNIFICANT CHANGE UP (ref 3.5–5)
PROT SERPL-MCNC: 7.2 G/DL — SIGNIFICANT CHANGE UP (ref 6–8)
RBC # BLD: 5.15 M/UL — SIGNIFICANT CHANGE UP (ref 4.7–6.1)
RBC # FLD: 13.7 % — SIGNIFICANT CHANGE UP (ref 11.5–14.5)
SODIUM SERPL-SCNC: 143 MMOL/L — SIGNIFICANT CHANGE UP (ref 135–146)
TROPONIN T SERPL-MCNC: <0.01 NG/ML — SIGNIFICANT CHANGE UP
WBC # BLD: 11.37 K/UL — HIGH (ref 4.8–10.8)
WBC # FLD AUTO: 11.37 K/UL — HIGH (ref 4.8–10.8)

## 2023-08-31 PROCEDURE — 85027 COMPLETE CBC AUTOMATED: CPT

## 2023-08-31 PROCEDURE — 99222 1ST HOSP IP/OBS MODERATE 55: CPT

## 2023-08-31 PROCEDURE — 85610 PROTHROMBIN TIME: CPT

## 2023-08-31 PROCEDURE — 80053 COMPREHEN METABOLIC PANEL: CPT

## 2023-08-31 PROCEDURE — 84484 ASSAY OF TROPONIN QUANT: CPT

## 2023-08-31 PROCEDURE — 36415 COLL VENOUS BLD VENIPUNCTURE: CPT

## 2023-08-31 PROCEDURE — 99221 1ST HOSP IP/OBS SF/LOW 40: CPT

## 2023-08-31 PROCEDURE — 99285 EMERGENCY DEPT VISIT HI MDM: CPT | Mod: FS

## 2023-08-31 PROCEDURE — 82553 CREATINE MB FRACTION: CPT

## 2023-08-31 PROCEDURE — 71045 X-RAY EXAM CHEST 1 VIEW: CPT | Mod: 26

## 2023-08-31 PROCEDURE — 82550 ASSAY OF CK (CPK): CPT

## 2023-08-31 PROCEDURE — 85730 THROMBOPLASTIN TIME PARTIAL: CPT

## 2023-08-31 PROCEDURE — 71275 CT ANGIOGRAPHY CHEST: CPT | Mod: 26,MA

## 2023-08-31 RX ORDER — ENOXAPARIN SODIUM 100 MG/ML
100 INJECTION SUBCUTANEOUS ONCE
Refills: 0 | Status: COMPLETED | OUTPATIENT
Start: 2023-08-31 | End: 2023-08-31

## 2023-08-31 RX ADMIN — ENOXAPARIN SODIUM 100 MILLIGRAM(S): 100 INJECTION SUBCUTANEOUS at 23:00

## 2023-08-31 NOTE — ED PROVIDER NOTE - CLINICAL SUMMARY MEDICAL DECISION MAKING FREE TEXT BOX
Patient presetns with shortness of breath. labs, ekg, cxr, cta done. + PE on imaging. Lovenox given. ICU consulted. Labs and EKG were ordered and reviewed.  Imaging was ordered and reviewed by me.  Appropriate medications for patient's presenting complaints were ordered and effects were reassessed.  Patient's records (prior hospital, ED visit, and/or nursing home notes if available) were reviewed.  Additional history was obtained from EMS, family, and/or PCP (where available).  Escalation to admission/observation was considered.  Patient requires inpatient hospitalization - monitored setting.

## 2023-08-31 NOTE — ED PROVIDER NOTE - PHYSICAL EXAMINATION
Vital Signs: I have reviewed the initial vital signs.  Constitutional: well-nourished, no acute distress, normocephalic  Eyes: PERRLA, EOMI, no nystagmus, clear conjunctiva  ENT: MMM,   Cardiovascular: regular rate, regular rhythm, no murmur appreciated  Respiratory: unlabored respiratory effort, clear to auscultation bilaterally  Gastrointestinal: soft, non-tender, non-distended  abdomen, no pulsatile mass  Musculoskeletal: supple neck, no lower extremity edema, no bony tenderness  Integumentary: warm, dry, no rash  Neurologic: awake, alert, cranial nerves II-XII grossly intact, extremities’ motor and sensory functions grossly intact, no focal deficits, GCS 15

## 2023-08-31 NOTE — ED ADULT NURSE NOTE - NSFALLUNIVINTERV_ED_ALL_ED
Bed/Stretcher in lowest position, wheels locked, appropriate side rails in place/Call bell, personal items and telephone in reach/Instruct patient to call for assistance before getting out of bed/chair/stretcher/Non-slip footwear applied when patient is off stretcher/Johnson to call system/Physically safe environment - no spills, clutter or unnecessary equipment/Purposeful proactive rounding/Room/bathroom lighting operational, light cord in reach

## 2023-08-31 NOTE — ED ADULT TRIAGE NOTE - CHIEF COMPLAINT QUOTE
pt went to doctor for sinus headache and pain, was given allergy testing and apparently developed sx of anaphylaxis and was given epi IM and benadryl IM by doctor but then was told to come to ER for d dimer

## 2023-08-31 NOTE — ED PROVIDER NOTE - ATTENDING APP SHARED VISIT CONTRIBUTION OF CARE
66 yo M pmh of HTN presents with exertional shortness of breath. States that for the last 3-4 days has been having some exertional shortness of breath which is new for him. Also reports some chest pressure. + congestion. Patient thought he was having an allergic reaction. went to allergist who did skin testing. While in the office was given benadryl and a dose of epi. Advised to go to the ED due to elevated BP.     CONSTITUTIONAL: Well-developed; well-nourished; in no acute distress.   SKIN: warm, dry  HEAD: Normocephalic; atraumatic.  EYES: PERRL, EOMI, no conjunctival erythema  ENT: No nasal discharge; airway clear.  NECK: Supple; non tender.  CARD: S1, S2 normal;  Regular rate and rhythm.   RESP: No wheezes, rales or rhonchi.  ABD: soft non tender, non distended, no rebound or guarding  EXT: Normal ROM.  5/5 strength in all 4 extremities   LYMPH: No acute cervical adenopathy.  NEURO: Alert, oriented, grossly unremarkable. neurovascularly intact  PSYCH: Cooperative, appropriate.

## 2023-08-31 NOTE — PATIENT PROFILE ADULT - NSPROPTRIGHTCAREGIVER_GEN_A_NUR
"Chief Complaint   Patient presents with     Follow-up     post angiogram     Establish Care       HPI: Venancio comes in today for recheck.  He had an angiogram on 5/4/2018 at [facility] and was negative.  He is doing well, no chest pain or shortness of breath.  He is here for wellness exam.  His hypertension hyperlipidemia remained stable.  His weight remained stable.  He has had no chest pain or shortness of breath.  His colonoscopy is up-to-date and his last PSA was last year.    ROS: All systems reviewed and are negative for chest pain shortness of breath bowel or bladder issues.    SH:    reports that he has quit smoking. He has never used smokeless tobacco. He reports that he does not drink alcohol.      FH: The Patient's family history includes Snoring in his father.     Meds:  Venancio has a current medication list which includes the following prescription(s): aspirin, lisinopril, metoprolol succinate, omega-3 fatty acids, and rosuvastatin.    O:  /60  Pulse (!) 57  Temp 97.5  F (36.4  C)  Resp 16  Ht 5' 5\" (1.651 m)  Wt 159 lb 4 oz (72.2 kg)  SpO2 98%  BMI 26.5 kg/m2     Constitutional:    --Vitals as above  --No acute distress  Eyes-  --Sclera noninjected  --Lids and conjunctiva normal  ENT-  --TMs clear  --Sclera noninjected  --Pharynx not erythematous  Neck-  --Neck supple with no cervical lymphadenopathy  Lungs-  --Clear to Auscultation  Heart-  --Regular rate and rhythm  Abdomen--  --Soft, non-tender, non-distended  Skin-  --Pink and dry  Psych-  --Alert and oriented  --Normal affect      A/P:   1. Hypertension  -Continue lisinopril and metoprolol    2. Hyperlipidemia  -Continue Crestor    3. Acute myocardial infarction  -Continue Crestor    4. Wellness examination  -Into [place] guidance given, colonoscopy and immunizations reviewed.  Recommended exercise and weight loss.  See back in 1 year.  - PSA (Prostatic-Specific Antigen), Annual " Screen                                           no

## 2023-09-01 ENCOUNTER — TRANSCRIPTION ENCOUNTER (OUTPATIENT)
Age: 68
End: 2023-09-01

## 2023-09-01 VITALS — HEART RATE: 77 BPM | SYSTOLIC BLOOD PRESSURE: 161 MMHG | DIASTOLIC BLOOD PRESSURE: 87 MMHG

## 2023-09-01 DIAGNOSIS — R09.89 OTHER SPECIFIED SYMPTOMS AND SIGNS INVOLVING THE CIRCULATORY AND RESPIRATORY SYSTEMS: ICD-10-CM

## 2023-09-01 DIAGNOSIS — I26.99 OTHER PULMONARY EMBOLISM WITHOUT ACUTE COR PULMONALE: ICD-10-CM

## 2023-09-01 LAB
ALBUMIN SERPL ELPH-MCNC: 3.8 G/DL — SIGNIFICANT CHANGE UP (ref 3.5–5.2)
ALP SERPL-CCNC: 62 U/L — SIGNIFICANT CHANGE UP (ref 30–115)
ALT FLD-CCNC: 29 U/L — SIGNIFICANT CHANGE UP (ref 0–41)
ANION GAP SERPL CALC-SCNC: 10 MMOL/L — SIGNIFICANT CHANGE UP (ref 7–14)
APTT BLD: 36.8 SEC — SIGNIFICANT CHANGE UP (ref 27–39.2)
AST SERPL-CCNC: 26 U/L — SIGNIFICANT CHANGE UP (ref 0–41)
BILIRUB SERPL-MCNC: 0.3 MG/DL — SIGNIFICANT CHANGE UP (ref 0.2–1.2)
BUN SERPL-MCNC: 16 MG/DL — SIGNIFICANT CHANGE UP (ref 10–20)
CALCIUM SERPL-MCNC: 8.5 MG/DL — SIGNIFICANT CHANGE UP (ref 8.4–10.5)
CHLORIDE SERPL-SCNC: 107 MMOL/L — SIGNIFICANT CHANGE UP (ref 98–110)
CK MB CFR SERPL CALC: 5 NG/ML — SIGNIFICANT CHANGE UP (ref 0.6–6.3)
CK SERPL-CCNC: 251 U/L — HIGH (ref 0–225)
CO2 SERPL-SCNC: 25 MMOL/L — SIGNIFICANT CHANGE UP (ref 17–32)
CREAT SERPL-MCNC: 0.9 MG/DL — SIGNIFICANT CHANGE UP (ref 0.7–1.5)
EGFR: 94 ML/MIN/1.73M2 — SIGNIFICANT CHANGE UP
GLUCOSE SERPL-MCNC: 148 MG/DL — HIGH (ref 70–99)
HCT VFR BLD CALC: 44.3 % — SIGNIFICANT CHANGE UP (ref 42–52)
HGB BLD-MCNC: 15.1 G/DL — SIGNIFICANT CHANGE UP (ref 14–18)
INR BLD: 1.03 RATIO — SIGNIFICANT CHANGE UP (ref 0.65–1.3)
MCHC RBC-ENTMCNC: 30 PG — SIGNIFICANT CHANGE UP (ref 27–31)
MCHC RBC-ENTMCNC: 34.1 G/DL — SIGNIFICANT CHANGE UP (ref 32–37)
MCV RBC AUTO: 88.1 FL — SIGNIFICANT CHANGE UP (ref 80–94)
NRBC # BLD: 0 /100 WBCS — SIGNIFICANT CHANGE UP (ref 0–0)
PLATELET # BLD AUTO: 204 K/UL — SIGNIFICANT CHANGE UP (ref 130–400)
PMV BLD: 10.1 FL — SIGNIFICANT CHANGE UP (ref 7.4–10.4)
POTASSIUM SERPL-MCNC: 4 MMOL/L — SIGNIFICANT CHANGE UP (ref 3.5–5)
POTASSIUM SERPL-SCNC: 4 MMOL/L — SIGNIFICANT CHANGE UP (ref 3.5–5)
PROT SERPL-MCNC: 6.5 G/DL — SIGNIFICANT CHANGE UP (ref 6–8)
PROTHROM AB SERPL-ACNC: 11.7 SEC — SIGNIFICANT CHANGE UP (ref 9.95–12.87)
RBC # BLD: 5.03 M/UL — SIGNIFICANT CHANGE UP (ref 4.7–6.1)
RBC # FLD: 13.7 % — SIGNIFICANT CHANGE UP (ref 11.5–14.5)
SODIUM SERPL-SCNC: 142 MMOL/L — SIGNIFICANT CHANGE UP (ref 135–146)
TROPONIN T SERPL-MCNC: <0.01 NG/ML — SIGNIFICANT CHANGE UP
WBC # BLD: 9.99 K/UL — SIGNIFICANT CHANGE UP (ref 4.8–10.8)
WBC # FLD AUTO: 9.99 K/UL — SIGNIFICANT CHANGE UP (ref 4.8–10.8)

## 2023-09-01 PROCEDURE — 99239 HOSP IP/OBS DSCHRG MGMT >30: CPT

## 2023-09-01 RX ORDER — APIXABAN 2.5 MG/1
1 TABLET, FILM COATED ORAL
Qty: 60 | Refills: 0
Start: 2023-09-01 | End: 2023-09-30

## 2023-09-01 RX ORDER — RIVAROXABAN 15 MG-20MG
1 KIT ORAL
Qty: 30 | Refills: 0
Start: 2023-09-01 | End: 2023-09-30

## 2023-09-01 RX ORDER — RIVAROXABAN 15 MG-20MG
1 KIT ORAL
Qty: 30 | Refills: 2
Start: 2023-09-01 | End: 2023-11-29

## 2023-09-01 RX ORDER — LISINOPRIL 2.5 MG/1
10 TABLET ORAL DAILY
Refills: 0 | Status: DISCONTINUED | OUTPATIENT
Start: 2023-09-01 | End: 2023-09-01

## 2023-09-01 RX ORDER — LISINOPRIL 2.5 MG/1
1 TABLET ORAL
Qty: 0 | Refills: 0 | DISCHARGE
Start: 2023-09-01

## 2023-09-01 RX ORDER — RIVAROXABAN 15 MG-20MG
15 KIT ORAL
Qty: 1 | Refills: 0
Start: 2023-09-01 | End: 2023-09-21

## 2023-09-01 RX ORDER — RIVAROXABAN 15 MG-20MG
1 KIT ORAL
Qty: 42 | Refills: 0
Start: 2023-09-01 | End: 2023-09-21

## 2023-09-01 RX ORDER — LISINOPRIL 2.5 MG/1
1 TABLET ORAL
Refills: 0 | DISCHARGE

## 2023-09-01 RX ORDER — ACETAMINOPHEN 500 MG
975 TABLET ORAL ONCE
Refills: 0 | Status: COMPLETED | OUTPATIENT
Start: 2023-09-01 | End: 2023-09-01

## 2023-09-01 RX ORDER — LISINOPRIL 2.5 MG/1
10 TABLET ORAL ONCE
Refills: 0 | Status: COMPLETED | OUTPATIENT
Start: 2023-09-01 | End: 2023-09-01

## 2023-09-01 RX ORDER — ENOXAPARIN SODIUM 100 MG/ML
100 INJECTION SUBCUTANEOUS EVERY 12 HOURS
Refills: 0 | Status: DISCONTINUED | OUTPATIENT
Start: 2023-09-01 | End: 2023-09-01

## 2023-09-01 RX ORDER — NIFEDIPINE 30 MG
1 TABLET, EXTENDED RELEASE 24 HR ORAL
Qty: 30 | Refills: 0
Start: 2023-09-01 | End: 2023-09-30

## 2023-09-01 RX ORDER — HYDRALAZINE HCL 50 MG
20 TABLET ORAL ONCE
Refills: 0 | Status: COMPLETED | OUTPATIENT
Start: 2023-09-01 | End: 2023-09-01

## 2023-09-01 RX ADMIN — Medication 20 MILLIGRAM(S): at 15:50

## 2023-09-01 RX ADMIN — LISINOPRIL 10 MILLIGRAM(S): 2.5 TABLET ORAL at 05:52

## 2023-09-01 RX ADMIN — LISINOPRIL 10 MILLIGRAM(S): 2.5 TABLET ORAL at 01:25

## 2023-09-01 RX ADMIN — Medication 975 MILLIGRAM(S): at 07:23

## 2023-09-01 RX ADMIN — ENOXAPARIN SODIUM 100 MILLIGRAM(S): 100 INJECTION SUBCUTANEOUS at 09:50

## 2023-09-01 RX ADMIN — Medication 975 MILLIGRAM(S): at 06:09

## 2023-09-01 NOTE — CONSULT NOTE ADULT - SUBJECTIVE AND OBJECTIVE BOX
Patient is a 67y old  Male who presents with a chief complaint of     HPI:  66yo male whose PMH includes HTN and pulmonary embolism in the past (march 2020, off AC patient mentioned was on xarelto and cant remember why it was stopped), presents to the ER due to shortness of breath for the past few days, he had occasional wheezing and tightness/pain, went to his allergist, and was advised to go to the hospital, found to have  of acute pulmonary embolism  on CT scan of the chest (01 Sep 2023 00:10). denied any recent surgery or leg pain.      PAST MEDICAL & SURGICAL HISTORY:  HTN (hypertension)      High cholesterol      Appendicitis      DVT (deep venous thrombosis)      History of appendectomy  pt states he never had an appendectomy          SOCIAL HX:   Smoking denied                      ETOH    denied                        Other  denied    FAMILY HISTORY:  No pertinent family history in first degree relatives    :  No known cardiovacular family hisotry     ROS:  See HPI     Allergies    No Known Allergies    Intolerances          PHYSICAL EXAM    ICU Vital Signs Last 24 Hrs  T(C): 36.2 (31 Aug 2023 23:20), Max: 37.1 (31 Aug 2023 19:06)  T(F): 97.2 (31 Aug 2023 23:20), Max: 98.8 (31 Aug 2023 19:06)  HR: 60 (31 Aug 2023 23:20) (60 - 73)  BP: 176/93 (31 Aug 2023 23:20) (176/93 - 193/105)  RR: 18 (31 Aug 2023 23:20) (18 - 18)  SpO2: 96% (31 Aug 2023 23:20) (96% - 98%)    O2 Parameters below as of 31 Aug 2023 23:20  Patient On (Oxygen Delivery Method): room air            General: In NAD   HEENT:  WILIAM              Lymphatic system: No cervical LN   Lungs: Bilateral BS  Cardiovascular: Regular  Gastrointestinal: Soft, Positive BS  Musculoskeletal: No edema.  Moves all extremities.  Full range of motion   Skin: Warm.  Intact  Neurological: No motor or sensory deficit         LABS:                          15.4   11.37 )-----------( 213      ( 31 Aug 2023 20:00 )             45.6                                               08-31    143  |  107  |  18  ----------------------------<  101<H>  4.2   |  24  |  1.0    Ca    9.1      31 Aug 2023 20:00    TPro  7.2  /  Alb  4.2  /  TBili  0.3  /  DBili  x   /  AST  26  /  ALT  30  /  AlkPhos  64  08-31                                             Urinalysis Basic - ( 31 Aug 2023 20:00 )    Color: x / Appearance: x / SG: x / pH: x  Gluc: 101 mg/dL / Ketone: x  / Bili: x / Urobili: x   Blood: x / Protein: x / Nitrite: x   Leuk Esterase: x / RBC: x / WBC x   Sq Epi: x / Non Sq Epi: x / Bacteria: x        CARDIAC MARKERS ( 31 Aug 2023 20:00 )  x     / <0.01 ng/mL / x     / x     / x                                                LIVER FUNCTIONS - ( 31 Aug 2023 20:00 )  Alb: 4.2 g/dL / Pro: 7.2 g/dL / ALK PHOS: 64 U/L / ALT: 30 U/L / AST: 26 U/L / GGT: x                                                                                                                                       X-Rays                                                                                     ECHO    MEDICATIONS  (STANDING):  enoxaparin Injectable 100 milliGRAM(s) SubCutaneous every 12 hours    MEDICATIONS  (PRN):    < from: CT Angio Chest PE Protocol w/ IV Cont (08.31.23 @ 21:56) >    ACC: [id number] EXAM:  CT ANGIO CHEST PULM ART [facility]   ORDERED BY:   JOHN SMITH     PROCEDURE DATE:  08/31/2023          INTERPRETATION:  Reason for study:  Possible pulmonary embolism.         Technique:  Technique: Approximately 70 cc of Optiray 320 was   administered intravenously. Multiple transaxial images of the thorax was   obtained, utilizing pulmonary embolism protocol (in order to facilitate   opacification of the pulmonary arterial tree).  30 cc of contrast   material was discarded.     MIP, Coronal and sagittal reformatted images were performed to better   evaluate anatomic relationships and for possible preoperative planning.        Comparison: CT thorax 8/9/2022.  Findings:    Pulmonary Arteries: Acute intraluminal filling defects within bilateral   central pulmonary artery and lobar branches (303-76).  No right heart strain.  Tubes and lines: None    Mediastinum/hilum: No adenopathy or mass.  .     Heart/Great Vessels:No pericardial effusions. Great vessels are normal   in caliber. Coronary artery calcifications    Abdomen: unremarkable visualized upper abdomen organs    Bones and soft tissues: Severe thoracic spondylosis    Lungs, Pleura, and Airways:   Patent central tracheobronchial tree. There is no evidence of  parenchymal mass or pleural effusions. No bronchiectasis or honeycombing.  Pulmonary nodules:  No suspicious pulmonary nodules.    IMPRESSION:    Since  8/9/2022    Interval development of acute intraluminal filling defects within both   central pulmonary arteries and lobar branches compatible with pulmonary   emboli.    No right heart strain.    --- End of Report ---            MARTHA HUANG MD; Attending Radiologist  This document has been electronically signed. Aug 31 2023 10:07PM    < end of copied text >

## 2023-09-01 NOTE — DISCHARGE NOTE PROVIDER - HOSPITAL COURSE
66yo male whose PMH includes HTN and pulmonary embolism in the past (not on anticoagulation), presents to the ER due to shortness of breath. CT in the ER showed Interval development of acute intraluminal filling defects within both central pulmonary arteries and lobar branches compatible with pulmonary emboli without heart strain. Patient was treated with AC and transitioned to DOAC on discharge. 66yo male whose PMH includes HTN and pulmonary embolism in the past (not on anticoagulation), presents to the ER due to shortness of breath. CT in the ER showed Interval development of acute intraluminal filling defects within both central pulmonary arteries and lobar branches compatible with pulmonary emboli without heart strain. Patient was treated with AC and transitioned to DOAC on discharge. Patient was stable on discharge and did not require oxygen while ambulating throughout hospitalization.

## 2023-09-01 NOTE — DISCHARGE NOTE PROVIDER - CARE PROVIDER_API CALL
Bear Ahumada  Internal Medicine  [address] [place] [address]  Phone: [phone]  Fax: [phone]  Follow Up Time:     Yon Young  Internal Medicine  [address] [place] [address]  Phone: [phone]  Fax: [phone]  Follow Up Time:

## 2023-09-01 NOTE — CHART NOTE - NSCHARTNOTEFT_GEN_A_CORE
Patient with various pains including chest and back. Will try 1 dose of Tylenol and check CE and EKG (but suspect due to PE) Patient with various pains including chest and back. Will try 1 dose of Tylenol and check CE with morning labs though per discussion with RN, no change seen on rhythm strip Patient with various pains including chest and back. Will try 1 dose of Tylenol and check CE with morning labs though per discussion with RN, no change seen on rhythm strip. Pulse ox is 96% on room air

## 2023-09-01 NOTE — DISCHARGE NOTE NURSING/CASE MANAGEMENT/SOCIAL WORK - NSDCPEFALRISK_GEN_ALL_CORE
For information on Fall & Injury Prevention, visit: [email or web address] OR  [email or web address] OR  https://www.cdc.gov/steadi/patient.html

## 2023-09-01 NOTE — H&P ADULT - NSHPLABSRESULTS_GEN_ALL_CORE
15.4   11.37 )-----------( 213      ( 31 Aug 2023 20:00 )             45.6     08-31    143  |  107  |  18  ----------------------------<  101<H>  4.2   |  24  |  1.0    Ca    9.1      31 Aug 2023 20:00    TPro  7.2  /  Alb  4.2  /  TBili  0.3  /  DBili  x   /  AST  26  /  ALT  30  /  AlkPhos  64  08-31          Urinalysis Basic - ( 31 Aug 2023 20:00 )    Color: x / Appearance: x / SG: x / pH: x  Gluc: 101 mg/dL / Ketone: x  / Bili: x / Urobili: x   Blood: x / Protein: x / Nitrite: x   Leuk Esterase: x / RBC: x / WBC x   Sq Epi: x / Non Sq Epi: x / Bacteria: x        Lactate Trend    CARDIAC MARKERS ( 31 Aug 2023 20:00 )  x     / <0.01 ng/mL / x     / x     / x          CAPILLARY BLOOD GLUCOSE  < from: CT Angio Chest PE Protocol w/ IV Cont (08.31.23 @ 21:56) >      ACC: [id number] EXAM:  CT ANGIO CHEST PULM ART [facility]   ORDERED BY:   JOHN SMITH     PROCEDURE DATE:  08/31/2023        < end of copied text >    < from: CT Angio Chest PE Protocol w/ IV Cont (08.31.23 @ 21:56) >    IMPRESSION:    Since  8/9/2022    Interval development of acute intraluminal filling defects within both   central pulmonary arteries and lobar branches compatible with pulmonary   emboli.    No right heart strain.      MARTHA HUANG MD; Attending Radiologist  This document has been electronically signed. Aug 31 2023 10:07PM    < end of copied text >

## 2023-09-01 NOTE — H&P ADULT - ASSESSMENT
Recurrence of a pulmonary embolism    HTN  Recurrence of a pulmonary embolism    HTN (ssems to be poorly controlled at baseline)  Recurrence of a pulmonary embolism - for now continue Lovenox S/Q, consider oral meds in am. [place] consider ECHO, venous duplex studies of legs      HTN (may be be poorly controlled at baseline) - for now lisinopril 10 mg daily

## 2023-09-01 NOTE — DISCHARGE NOTE PROVIDER - NSDCCPCAREPLAN_GEN_ALL_CORE_FT
PRINCIPAL DISCHARGE DIAGNOSIS  Diagnosis: Pulmonary embolism  Assessment and Plan of Treatment: You were diagnosed with a pulmonary embolism which appears unprovoked. Please follow up with your PCP regarding this hospitalization. You will need a hypercoagulable workup as outpatient. A referral to a hematologist has been given to you. Please continue taking all your Eliquis as prescribed. Please avoid high impact activities such as skiing, boxing, bike riding because you are high risk for bleeding.     PRINCIPAL DISCHARGE DIAGNOSIS  Diagnosis: Pulmonary embolism  Assessment and Plan of Treatment: You were diagnosed with a pulmonary embolism which appears unprovoked. Please follow up with your PCP regarding this hospitalization. You will need a hypercoagulable workup as outpatient. A referral to a hematologist has been given to you. Please continue taking all your Eliquis as prescribed. Please avoid high impact activities such as skiing, boxing, bike riding because you are high risk for bleeding. Please follow up with your PCP regarding this hospitalization.     PRINCIPAL DISCHARGE DIAGNOSIS  Diagnosis: Pulmonary embolism  Assessment and Plan of Treatment: You were diagnosed with a pulmonary embolism which appears unprovoked. Please follow up with your PCP regarding this hospitalization. You will need a hypercoagulable workup as outpatient. A referral to a hematologist has been given to you. Please continue taking all your Eliquis as prescribed. Please avoid high impact activities such as skiing, boxing, bike riding because you are high risk for bleeding. Please follow up with your PCP regarding this hospitalization.      SECONDARY DISCHARGE DIAGNOSES  Diagnosis: HTN (hypertension)  Assessment and Plan of Treatment: Please continue taking your lisinopril and start nifedipine.

## 2023-09-01 NOTE — DISCHARGE NOTE PROVIDER - NSDCMRMEDTOKEN_GEN_ALL_CORE_FT
Eliquis Starter Pack for Treatment of DVT and PE 5 mg oral tablet: 1 tab(s) orally 2 times a day Please take 10mg PO two times per day until 9/8 then 5mg PO two times per day  lisinopril 10 mg oral tablet: 1 tab(s) orally once a day   lisinopril 10 mg oral tablet: 1 tab(s) orally once a day  Xarelto 15 mg oral tablet: 1 tab(s) orally 2 times a day Please take until 9/22  Xarelto 20 mg oral tablet: 1 tab(s) orally once a day Please begin taking on 9/23   lisinopril 10 mg oral tablet: 1 tab(s) orally once a day  NIFEdipine 30 mg oral tablet, extended release: 1 tab(s) orally once a day  Xarelto 15 mg oral tablet: 1 tab(s) orally 2 times a day Please take until 9/22  Xarelto 20 mg oral tablet: 1 tab(s) orally once a day Please begin taking on 9/23

## 2023-09-01 NOTE — DISCHARGE NOTE NURSING/CASE MANAGEMENT/SOCIAL WORK - PATIENT PORTAL LINK FT
You can access the FollowMyHealth Patient Portal offered by [facility] by registering at the following website: [email or web address]. By joining [facility]’s FollowMyHealth portal, you will also be able to view your health information using other applications (apps) compatible with our system.

## 2023-09-01 NOTE — H&P ADULT - PSYCHIATRIC COMMENTS
Briefly got very angry when I asked him about usual BP readings ("How am I supposed to know, do I wear a BP  machine on my head")

## 2023-09-01 NOTE — CONSULT NOTE ADULT - ASSESSMENT
68 yo man with HTN,dld, DVT/PE in 2020(not currently on any AC), came in with SOB on exertion for the past 5 days, initially went to his allergist and was advised to go to the hospital, found to have acute PE(trop -ve, probnp -ve, no rv strain on ct scan)    Ass:  Acute PE sPESI 0  JONAS  hx of DVT/PE in 2020  Plan  will need long term AC, 2d echo, LE duplex  Check pulse ox on exertion   Outpatient age appropriate cancer screening  Lovenox 1mg/kg BID as inpatient  switch to xarelto as outpatient  No need for MICU at this point, recall if any change in status.

## 2023-09-05 ENCOUNTER — NON-APPOINTMENT (OUTPATIENT)
Age: 68
End: 2023-09-05

## 2023-09-11 DIAGNOSIS — I26.99 OTHER PULMONARY EMBOLISM WITHOUT ACUTE COR PULMONALE: ICD-10-CM

## 2023-09-11 DIAGNOSIS — I10 ESSENTIAL (PRIMARY) HYPERTENSION: ICD-10-CM

## 2023-09-11 DIAGNOSIS — E78.5 HYPERLIPIDEMIA, UNSPECIFIED: ICD-10-CM

## 2023-09-11 DIAGNOSIS — Z86.711 PERSONAL HISTORY OF PULMONARY EMBOLISM: ICD-10-CM

## 2023-09-11 DIAGNOSIS — M47.814 SPONDYLOSIS WITHOUT MYELOPATHY OR RADICULOPATHY, THORACIC REGION: ICD-10-CM

## 2023-09-11 DIAGNOSIS — Z86.718 PERSONAL HISTORY OF OTHER VENOUS THROMBOSIS AND EMBOLISM: ICD-10-CM
